# Patient Record
Sex: FEMALE | Race: WHITE | NOT HISPANIC OR LATINO | Employment: UNEMPLOYED | URBAN - METROPOLITAN AREA
[De-identification: names, ages, dates, MRNs, and addresses within clinical notes are randomized per-mention and may not be internally consistent; named-entity substitution may affect disease eponyms.]

---

## 2023-02-28 LAB
ALANINE AMINOTRANSFERASE (SGPT) (U/L) IN SER/PLAS: 21 U/L (ref 7–45)
ALBUMIN (G/DL) IN SER/PLAS: 4.5 G/DL (ref 3.4–5)
ALKALINE PHOSPHATASE (U/L) IN SER/PLAS: 57 U/L (ref 33–136)
ANION GAP IN SER/PLAS: 12 MMOL/L (ref 10–20)
ASPARTATE AMINOTRANSFERASE (SGOT) (U/L) IN SER/PLAS: 22 U/L (ref 9–39)
BILIRUBIN TOTAL (MG/DL) IN SER/PLAS: 0.7 MG/DL (ref 0–1.2)
CALCIUM (MG/DL) IN SER/PLAS: 9.9 MG/DL (ref 8.6–10.6)
CARBON DIOXIDE, TOTAL (MMOL/L) IN SER/PLAS: 31 MMOL/L (ref 21–32)
CHLORIDE (MMOL/L) IN SER/PLAS: 101 MMOL/L (ref 98–107)
CHOLESTEROL (MG/DL) IN SER/PLAS: 199 MG/DL (ref 0–199)
CHOLESTEROL IN HDL (MG/DL) IN SER/PLAS: 77.3 MG/DL
CHOLESTEROL/HDL RATIO: 2.6
CREATININE (MG/DL) IN SER/PLAS: 0.95 MG/DL (ref 0.5–1.05)
ERYTHROCYTE DISTRIBUTION WIDTH (RATIO) BY AUTOMATED COUNT: 12.7 % (ref 11.5–14.5)
ERYTHROCYTE MEAN CORPUSCULAR HEMOGLOBIN CONCENTRATION (G/DL) BY AUTOMATED: 33.2 G/DL (ref 32–36)
ERYTHROCYTE MEAN CORPUSCULAR VOLUME (FL) BY AUTOMATED COUNT: 96 FL (ref 80–100)
ERYTHROCYTES (10*6/UL) IN BLOOD BY AUTOMATED COUNT: 4.39 X10E12/L (ref 4–5.2)
GFR FEMALE: 60 ML/MIN/1.73M2
GLUCOSE (MG/DL) IN SER/PLAS: 92 MG/DL (ref 74–99)
HEMATOCRIT (%) IN BLOOD BY AUTOMATED COUNT: 42.2 % (ref 36–46)
HEMOGLOBIN (G/DL) IN BLOOD: 14 G/DL (ref 12–16)
LDL: 107 MG/DL (ref 0–99)
LEUKOCYTES (10*3/UL) IN BLOOD BY AUTOMATED COUNT: 4 X10E9/L (ref 4.4–11.3)
NRBC (PER 100 WBCS) BY AUTOMATED COUNT: 0 /100 WBC (ref 0–0)
PLATELETS (10*3/UL) IN BLOOD AUTOMATED COUNT: 220 X10E9/L (ref 150–450)
POTASSIUM (MMOL/L) IN SER/PLAS: 4.2 MMOL/L (ref 3.5–5.3)
PROTEIN TOTAL: 6.6 G/DL (ref 6.4–8.2)
SODIUM (MMOL/L) IN SER/PLAS: 140 MMOL/L (ref 136–145)
THYROTROPIN (MIU/L) IN SER/PLAS BY DETECTION LIMIT <= 0.05 MIU/L: 4.37 MIU/L (ref 0.44–3.98)
THYROXINE (T4) FREE (NG/DL) IN SER/PLAS: 1.11 NG/DL (ref 0.78–1.48)
TRIGLYCERIDE (MG/DL) IN SER/PLAS: 76 MG/DL (ref 0–149)
UREA NITROGEN (MG/DL) IN SER/PLAS: 14 MG/DL (ref 6–23)
VLDL: 15 MG/DL (ref 0–40)

## 2023-03-16 DIAGNOSIS — N39.0 URINARY TRACT INFECTION, SITE NOT SPECIFIED: ICD-10-CM

## 2023-03-16 RX ORDER — SULFAMETHOXAZOLE AND TRIMETHOPRIM 800; 160 MG/1; MG/1
TABLET ORAL
COMMUNITY
Start: 2017-11-20 | End: 2023-12-19 | Stop reason: ALTCHOICE

## 2023-03-16 RX ORDER — DENOSUMAB 60 MG/ML
INJECTION SUBCUTANEOUS
COMMUNITY
Start: 2023-01-24

## 2023-03-16 RX ORDER — FAMOTIDINE 40 MG/1
TABLET, FILM COATED ORAL
COMMUNITY
End: 2023-12-19 | Stop reason: ALTCHOICE

## 2023-03-16 RX ORDER — CEFDINIR 300 MG/1
CAPSULE ORAL
COMMUNITY
Start: 2021-01-29 | End: 2023-03-16 | Stop reason: SDUPTHER

## 2023-03-16 RX ORDER — AMLODIPINE BESYLATE 5 MG/1
1 TABLET ORAL DAILY
COMMUNITY
End: 2023-09-07 | Stop reason: ALTCHOICE

## 2023-03-16 RX ORDER — AMOXICILLIN 500 MG
1200 CAPSULE ORAL 2 TIMES DAILY
COMMUNITY

## 2023-03-16 RX ORDER — CEFDINIR 300 MG/1
300 CAPSULE ORAL DAILY
Qty: 90 CAPSULE | Refills: 0 | Status: SHIPPED | OUTPATIENT
Start: 2023-03-16 | End: 2023-03-16 | Stop reason: SDUPTHER

## 2023-03-16 RX ORDER — HYDROCODONE BITARTRATE AND ACETAMINOPHEN 5; 325 MG/1; MG/1
TABLET ORAL
COMMUNITY
Start: 2022-05-13 | End: 2023-12-19 | Stop reason: ALTCHOICE

## 2023-03-16 RX ORDER — VIBEGRON 75 MG/1
1 TABLET, FILM COATED ORAL DAILY
COMMUNITY
Start: 2023-03-06 | End: 2023-12-19 | Stop reason: ALTCHOICE

## 2023-03-16 RX ORDER — NYSTATIN AND TRIAMCINOLONE ACETONIDE 100000; 1 [USP'U]/G; MG/G
OINTMENT TOPICAL
COMMUNITY
Start: 2022-09-08 | End: 2023-09-07 | Stop reason: SDUPTHER

## 2023-03-16 RX ORDER — CLOBETASOL PROPIONATE 0.5 MG/G
OINTMENT TOPICAL
COMMUNITY
Start: 2022-01-13

## 2023-03-16 RX ORDER — CEFDINIR 300 MG/1
300 CAPSULE ORAL DAILY
Qty: 90 CAPSULE | Refills: 0 | Status: SHIPPED | OUTPATIENT
Start: 2023-03-16 | End: 2023-12-19 | Stop reason: SDUPTHER

## 2023-03-16 RX ORDER — ATORVASTATIN CALCIUM 20 MG/1
1 TABLET, FILM COATED ORAL DAILY
COMMUNITY
Start: 2016-11-11 | End: 2023-10-03

## 2023-03-16 RX ORDER — MULTIVITAMIN
1 TABLET ORAL DAILY
COMMUNITY

## 2023-03-16 RX ORDER — CEFDINIR 300 MG/1
CAPSULE ORAL
Qty: 90 CAPSULE | Refills: 3 | Status: SHIPPED | OUTPATIENT
Start: 2023-03-16 | End: 2023-09-07 | Stop reason: SDUPTHER

## 2023-03-16 RX ORDER — TOPIRAMATE SPINKLE 25 MG/1
CAPSULE ORAL
COMMUNITY
End: 2023-12-19 | Stop reason: ALTCHOICE

## 2023-03-16 RX ORDER — ATENOLOL 25 MG/1
TABLET ORAL
COMMUNITY
Start: 2015-05-05 | End: 2023-12-19 | Stop reason: ALTCHOICE

## 2023-03-16 RX ORDER — NAPROXEN SODIUM 220 MG/1
TABLET, FILM COATED ORAL
COMMUNITY
Start: 2020-11-09 | End: 2023-09-07 | Stop reason: SDUPTHER

## 2023-03-16 RX ORDER — CALCIUM CARBONATE/VITAMIN D3 600MG-5MCG
TABLET ORAL
COMMUNITY

## 2023-03-16 RX ORDER — ASCORBIC ACID 125 MG
TABLET,CHEWABLE ORAL 2 TIMES DAILY
COMMUNITY

## 2023-03-17 LAB
CREATININE (MG/DL) IN SER/PLAS: 0.97 MG/DL (ref 0.5–1.05)
GFR FEMALE: 58 ML/MIN/1.73M2
POC CALCIUM IONIZED (MMOL/L) IN BLOOD: 1.31 MMOL/L (ref 1.1–1.33)

## 2023-04-04 LAB
ALBUMIN (G/DL) IN SER/PLAS: 3.9 G/DL (ref 3.4–5)
ANION GAP IN SER/PLAS: 11 MMOL/L (ref 10–20)
CALCIUM (MG/DL) IN SER/PLAS: 9.8 MG/DL (ref 8.6–10.6)
CARBON DIOXIDE, TOTAL (MMOL/L) IN SER/PLAS: 30 MMOL/L (ref 21–32)
CHLORIDE (MMOL/L) IN SER/PLAS: 102 MMOL/L (ref 98–107)
CREATININE (MG/DL) IN SER/PLAS: 0.97 MG/DL (ref 0.5–1.05)
ERYTHROCYTE DISTRIBUTION WIDTH (RATIO) BY AUTOMATED COUNT: 12.8 % (ref 11.5–14.5)
ERYTHROCYTE MEAN CORPUSCULAR HEMOGLOBIN CONCENTRATION (G/DL) BY AUTOMATED: 32.3 G/DL (ref 32–36)
ERYTHROCYTE MEAN CORPUSCULAR VOLUME (FL) BY AUTOMATED COUNT: 98 FL (ref 80–100)
ERYTHROCYTES (10*6/UL) IN BLOOD BY AUTOMATED COUNT: 4.03 X10E12/L (ref 4–5.2)
GFR FEMALE: 58 ML/MIN/1.73M2
GLUCOSE (MG/DL) IN SER/PLAS: 102 MG/DL (ref 74–99)
HEMATOCRIT (%) IN BLOOD BY AUTOMATED COUNT: 39.3 % (ref 36–46)
HEMOGLOBIN (G/DL) IN BLOOD: 12.7 G/DL (ref 12–16)
LEUKOCYTES (10*3/UL) IN BLOOD BY AUTOMATED COUNT: 3.9 X10E9/L (ref 4.4–11.3)
NRBC (PER 100 WBCS) BY AUTOMATED COUNT: 0 /100 WBC (ref 0–0)
PARATHYRIN INTACT (PG/ML) IN SER/PLAS: 38.7 PG/ML (ref 18.5–88)
PHOSPHATE (MG/DL) IN SER/PLAS: 4 MG/DL (ref 2.5–4.9)
PLATELETS (10*3/UL) IN BLOOD AUTOMATED COUNT: 212 X10E9/L (ref 150–450)
POTASSIUM (MMOL/L) IN SER/PLAS: 4.8 MMOL/L (ref 3.5–5.3)
SODIUM (MMOL/L) IN SER/PLAS: 138 MMOL/L (ref 136–145)
UREA NITROGEN (MG/DL) IN SER/PLAS: 17 MG/DL (ref 6–23)

## 2023-05-01 LAB
ALANINE AMINOTRANSFERASE (SGPT) (U/L) IN SER/PLAS: 17 U/L (ref 7–45)
ALBUMIN (G/DL) IN SER/PLAS: 4.3 G/DL (ref 3.4–5)
ALBUMIN (MG/L) IN URINE: 75.2 MG/L
ALBUMIN/CREATININE (UG/MG) IN URINE: 169 UG/MG CRT (ref 0–30)
ALKALINE PHOSPHATASE (U/L) IN SER/PLAS: 58 U/L (ref 33–136)
ANION GAP IN SER/PLAS: 13 MMOL/L (ref 10–20)
APPEARANCE, URINE: ABNORMAL
ASPARTATE AMINOTRANSFERASE (SGOT) (U/L) IN SER/PLAS: 19 U/L (ref 9–39)
BILIRUBIN TOTAL (MG/DL) IN SER/PLAS: 0.4 MG/DL (ref 0–1.2)
BILIRUBIN, URINE: NEGATIVE
BLOOD, URINE: ABNORMAL
CALCIUM (MG/DL) IN SER/PLAS: 9.7 MG/DL (ref 8.6–10.6)
CARBON DIOXIDE, TOTAL (MMOL/L) IN SER/PLAS: 28 MMOL/L (ref 21–32)
CHLORIDE (MMOL/L) IN SER/PLAS: 101 MMOL/L (ref 98–107)
CHOLESTEROL (MG/DL) IN SER/PLAS: 176 MG/DL (ref 0–199)
CHOLESTEROL IN HDL (MG/DL) IN SER/PLAS: 70 MG/DL
CHOLESTEROL/HDL RATIO: 2.5
COLOR, URINE: YELLOW
CREATININE (MG/DL) IN SER/PLAS: 0.92 MG/DL (ref 0.5–1.05)
CREATININE (MG/DL) IN URINE: 44.5 MG/DL (ref 20–320)
ERYTHROCYTE DISTRIBUTION WIDTH (RATIO) BY AUTOMATED COUNT: 12.6 % (ref 11.5–14.5)
ERYTHROCYTE MEAN CORPUSCULAR HEMOGLOBIN CONCENTRATION (G/DL) BY AUTOMATED: 32.4 G/DL (ref 32–36)
ERYTHROCYTE MEAN CORPUSCULAR VOLUME (FL) BY AUTOMATED COUNT: 97 FL (ref 80–100)
ERYTHROCYTES (10*6/UL) IN BLOOD BY AUTOMATED COUNT: 4.32 X10E12/L (ref 4–5.2)
GFR FEMALE: 62 ML/MIN/1.73M2
GLUCOSE (MG/DL) IN SER/PLAS: 94 MG/DL (ref 74–99)
GLUCOSE, URINE: NEGATIVE MG/DL
HEMATOCRIT (%) IN BLOOD BY AUTOMATED COUNT: 41.7 % (ref 36–46)
HEMOGLOBIN (G/DL) IN BLOOD: 13.5 G/DL (ref 12–16)
KETONES, URINE: NEGATIVE MG/DL
LDL: 90 MG/DL (ref 0–99)
LEUKOCYTE ESTERASE, URINE: ABNORMAL
LEUKOCYTES (10*3/UL) IN BLOOD BY AUTOMATED COUNT: 4.4 X10E9/L (ref 4.4–11.3)
MYOGLOBIN, URINE: NEGATIVE
NITRITE, URINE: NEGATIVE
NRBC (PER 100 WBCS) BY AUTOMATED COUNT: 0 /100 WBC (ref 0–0)
PH, URINE: 8 (ref 5–8)
PLATELETS (10*3/UL) IN BLOOD AUTOMATED COUNT: 210 X10E9/L (ref 150–450)
POTASSIUM (MMOL/L) IN SER/PLAS: 4.7 MMOL/L (ref 3.5–5.3)
PROTEIN TOTAL: 6.9 G/DL (ref 6.4–8.2)
PROTEIN, URINE: ABNORMAL MG/DL
RBC, URINE: 69 /HPF (ref 0–5)
SODIUM (MMOL/L) IN SER/PLAS: 137 MMOL/L (ref 136–145)
SPECIFIC GRAVITY, URINE: 1.01 (ref 1–1.03)
SQUAMOUS EPITHELIAL CELLS, URINE: 1 /HPF
THYROTROPIN (MIU/L) IN SER/PLAS BY DETECTION LIMIT <= 0.05 MIU/L: 3.3 MIU/L (ref 0.44–3.98)
TRIGLYCERIDE (MG/DL) IN SER/PLAS: 78 MG/DL (ref 0–149)
UREA NITROGEN (MG/DL) IN SER/PLAS: 16 MG/DL (ref 6–23)
UROBILINOGEN, URINE: <2 MG/DL (ref 0–1.9)
VLDL: 16 MG/DL (ref 0–40)
WBC CLUMPS, URINE: ABNORMAL /HPF
WBC, URINE: 323 /HPF (ref 0–5)

## 2023-05-02 ENCOUNTER — TELEPHONE (OUTPATIENT)
Dept: PRIMARY CARE | Facility: CLINIC | Age: 83
End: 2023-05-02
Payer: MEDICARE

## 2023-05-02 LAB — URINE CULTURE: NORMAL

## 2023-05-02 NOTE — TELEPHONE ENCOUNTER
----- Message from Autumn Weston MD sent at 5/2/2023  1:10 PM EDT -----  Call  results message below.   Then send results  to patient.

## 2023-05-03 ENCOUNTER — TELEPHONE (OUTPATIENT)
Dept: PRIMARY CARE | Facility: CLINIC | Age: 83
End: 2023-05-03
Payer: MEDICARE

## 2023-05-03 NOTE — TELEPHONE ENCOUNTER
----- Message from Autumn Weston MD sent at 5/2/2023  5:53 PM EDT -----  Please call the patient regarding her abnormal result.

## 2023-05-31 LAB
ANION GAP IN SER/PLAS: 11 MMOL/L (ref 10–20)
APPEARANCE, URINE: NORMAL
BILIRUBIN, URINE: NEGATIVE
BLOOD, URINE: NEGATIVE
CALCIUM (MG/DL) IN SER/PLAS: 9.6 MG/DL (ref 8.6–10.6)
CARBON DIOXIDE, TOTAL (MMOL/L) IN SER/PLAS: 30 MMOL/L (ref 21–32)
CHLORIDE (MMOL/L) IN SER/PLAS: 99 MMOL/L (ref 98–107)
COLOR, URINE: YELLOW
CREATININE (MG/DL) IN SER/PLAS: 1.08 MG/DL (ref 0.5–1.05)
GFR FEMALE: 51 ML/MIN/1.73M2
GLUCOSE (MG/DL) IN SER/PLAS: 172 MG/DL (ref 74–99)
GLUCOSE, URINE: NEGATIVE MG/DL
KETONES, URINE: NEGATIVE MG/DL
LEUKOCYTE ESTERASE, URINE: NEGATIVE
NITRITE, URINE: NEGATIVE
PH, URINE: 6 (ref 5–8)
POTASSIUM (MMOL/L) IN SER/PLAS: 4 MMOL/L (ref 3.5–5.3)
PROTEIN, URINE: NEGATIVE MG/DL
SODIUM (MMOL/L) IN SER/PLAS: 136 MMOL/L (ref 136–145)
SPECIFIC GRAVITY, URINE: 1.01 (ref 1–1.03)
UREA NITROGEN (MG/DL) IN SER/PLAS: 19 MG/DL (ref 6–23)
UROBILINOGEN, URINE: <2 MG/DL (ref 0–1.9)

## 2023-06-01 LAB — URINE CULTURE: NO GROWTH

## 2023-06-07 LAB
ANION GAP IN SER/PLAS: 12 MMOL/L (ref 10–20)
APPEARANCE, URINE: NORMAL
BILIRUBIN, URINE: NEGATIVE
BLOOD, URINE: NEGATIVE
CALCIUM (MG/DL) IN SER/PLAS: 9.4 MG/DL (ref 8.6–10.6)
CARBON DIOXIDE, TOTAL (MMOL/L) IN SER/PLAS: 28 MMOL/L (ref 21–32)
CHLORIDE (MMOL/L) IN SER/PLAS: 90 MMOL/L (ref 98–107)
COLOR, URINE: YELLOW
CREATININE (MG/DL) IN SER/PLAS: 0.87 MG/DL (ref 0.5–1.05)
GFR FEMALE: 66 ML/MIN/1.73M2
GLUCOSE (MG/DL) IN SER/PLAS: 101 MG/DL (ref 74–99)
GLUCOSE, URINE: NEGATIVE MG/DL
KETONES, URINE: NEGATIVE MG/DL
LEUKOCYTE ESTERASE, URINE: NEGATIVE
NITRITE, URINE: NEGATIVE
PH, URINE: 7 (ref 5–8)
POTASSIUM (MMOL/L) IN SER/PLAS: 4.8 MMOL/L (ref 3.5–5.3)
PROTEIN, URINE: NEGATIVE MG/DL
SODIUM (MMOL/L) IN SER/PLAS: 125 MMOL/L (ref 136–145)
SPECIFIC GRAVITY, URINE: 1.01 (ref 1–1.03)
UREA NITROGEN (MG/DL) IN SER/PLAS: 17 MG/DL (ref 6–23)
UROBILINOGEN, URINE: <2 MG/DL (ref 0–1.9)

## 2023-06-08 LAB — URINE CULTURE: NO GROWTH

## 2023-06-14 DIAGNOSIS — Z12.31 ENCOUNTER FOR SCREENING MAMMOGRAM FOR MALIGNANT NEOPLASM OF BREAST: ICD-10-CM

## 2023-06-14 LAB
ANION GAP IN SER/PLAS: 10 MMOL/L (ref 10–20)
APPEARANCE, URINE: NORMAL
BILIRUBIN, URINE: NEGATIVE
BLOOD, URINE: NEGATIVE
CALCIUM (MG/DL) IN SER/PLAS: 9.7 MG/DL (ref 8.6–10.6)
CARBON DIOXIDE, TOTAL (MMOL/L) IN SER/PLAS: 33 MMOL/L (ref 21–32)
CHLORIDE (MMOL/L) IN SER/PLAS: 101 MMOL/L (ref 98–107)
COLOR, URINE: YELLOW
CREATININE (MG/DL) IN SER/PLAS: 0.97 MG/DL (ref 0.5–1.05)
GFR FEMALE: 58 ML/MIN/1.73M2
GLUCOSE (MG/DL) IN SER/PLAS: 95 MG/DL (ref 74–99)
GLUCOSE, URINE: NEGATIVE MG/DL
KETONES, URINE: NEGATIVE MG/DL
LEUKOCYTE ESTERASE, URINE: NEGATIVE
NITRITE, URINE: NEGATIVE
PH, URINE: 7 (ref 5–8)
POTASSIUM (MMOL/L) IN SER/PLAS: 3.8 MMOL/L (ref 3.5–5.3)
PROTEIN, URINE: NEGATIVE MG/DL
SODIUM (MMOL/L) IN SER/PLAS: 140 MMOL/L (ref 136–145)
SPECIFIC GRAVITY, URINE: 1.01 (ref 1–1.03)
UREA NITROGEN (MG/DL) IN SER/PLAS: 21 MG/DL (ref 6–23)
UROBILINOGEN, URINE: <2 MG/DL (ref 0–1.9)

## 2023-06-15 LAB — URINE CULTURE: NO GROWTH

## 2023-09-05 ENCOUNTER — TELEPHONE (OUTPATIENT)
Dept: PRIMARY CARE | Facility: CLINIC | Age: 83
End: 2023-09-05
Payer: MEDICARE

## 2023-09-05 DIAGNOSIS — Z00.00 HEALTHCARE MAINTENANCE: Primary | ICD-10-CM

## 2023-09-05 DIAGNOSIS — Z13.6 SCREENING FOR CARDIOVASCULAR CONDITION: ICD-10-CM

## 2023-09-05 DIAGNOSIS — E78.5 HYPERLIPIDEMIA, UNSPECIFIED HYPERLIPIDEMIA TYPE: ICD-10-CM

## 2023-09-05 DIAGNOSIS — Z13.21 ENCOUNTER FOR VITAMIN DEFICIENCY SCREENING: ICD-10-CM

## 2023-09-05 DIAGNOSIS — E03.9 HYPOTHYROIDISM, UNSPECIFIED TYPE: ICD-10-CM

## 2023-09-06 ENCOUNTER — LAB (OUTPATIENT)
Dept: LAB | Facility: LAB | Age: 83
End: 2023-09-06
Payer: MEDICARE

## 2023-09-06 DIAGNOSIS — E78.5 HYPERLIPIDEMIA, UNSPECIFIED HYPERLIPIDEMIA TYPE: ICD-10-CM

## 2023-09-06 DIAGNOSIS — Z13.21 ENCOUNTER FOR VITAMIN DEFICIENCY SCREENING: ICD-10-CM

## 2023-09-06 LAB
CHOLESTEROL (MG/DL) IN SER/PLAS: 194 MG/DL (ref 0–199)
CHOLESTEROL IN HDL (MG/DL) IN SER/PLAS: 77.3 MG/DL
CHOLESTEROL/HDL RATIO: 2.5
LDL: 101 MG/DL (ref 0–99)
THYROXINE (T4) FREE (NG/DL) IN SER/PLAS: 1.06 NG/DL (ref 0.78–1.48)
TRIGLYCERIDE (MG/DL) IN SER/PLAS: 77 MG/DL (ref 0–149)
VLDL: 15 MG/DL (ref 0–40)

## 2023-09-06 PROCEDURE — 80061 LIPID PANEL: CPT

## 2023-09-06 PROCEDURE — 36415 COLL VENOUS BLD VENIPUNCTURE: CPT

## 2023-09-06 PROCEDURE — 82652 VIT D 1 25-DIHYDROXY: CPT

## 2023-09-06 PROCEDURE — 84439 ASSAY OF FREE THYROXINE: CPT

## 2023-09-06 ASSESSMENT — ENCOUNTER SYMPTOMS: ABDOMINAL PAIN: 0

## 2023-09-07 ENCOUNTER — OFFICE VISIT (OUTPATIENT)
Dept: PRIMARY CARE | Facility: CLINIC | Age: 83
End: 2023-09-07
Payer: MEDICARE

## 2023-09-07 VITALS
BODY MASS INDEX: 24.14 KG/M2 | DIASTOLIC BLOOD PRESSURE: 66 MMHG | SYSTOLIC BLOOD PRESSURE: 112 MMHG | HEART RATE: 71 BPM | OXYGEN SATURATION: 94 % | WEIGHT: 141.4 LBS | HEIGHT: 64 IN

## 2023-09-07 DIAGNOSIS — Z13.820 SCREENING FOR OSTEOPOROSIS: ICD-10-CM

## 2023-09-07 DIAGNOSIS — Z13.21 ENCOUNTER FOR VITAMIN DEFICIENCY SCREENING: ICD-10-CM

## 2023-09-07 DIAGNOSIS — E78.5 HYPERLIPIDEMIA, UNSPECIFIED HYPERLIPIDEMIA TYPE: ICD-10-CM

## 2023-09-07 DIAGNOSIS — Z13.6 SCREENING FOR CARDIOVASCULAR CONDITION: ICD-10-CM

## 2023-09-07 DIAGNOSIS — Z00.00 HEALTHCARE MAINTENANCE: ICD-10-CM

## 2023-09-07 DIAGNOSIS — N89.8 VAGINAL IRRITATION: Primary | ICD-10-CM

## 2023-09-07 DIAGNOSIS — N95.9 MENOPAUSAL DISORDER: ICD-10-CM

## 2023-09-07 PROCEDURE — 1160F RVW MEDS BY RX/DR IN RCRD: CPT | Performed by: INTERNAL MEDICINE

## 2023-09-07 PROCEDURE — 99214 OFFICE O/P EST MOD 30 MIN: CPT | Performed by: INTERNAL MEDICINE

## 2023-09-07 PROCEDURE — 1036F TOBACCO NON-USER: CPT | Performed by: INTERNAL MEDICINE

## 2023-09-07 PROCEDURE — 1126F AMNT PAIN NOTED NONE PRSNT: CPT | Performed by: INTERNAL MEDICINE

## 2023-09-07 PROCEDURE — 1159F MED LIST DOCD IN RCRD: CPT | Performed by: INTERNAL MEDICINE

## 2023-09-07 RX ORDER — DESMOPRESSIN ACETATE 0.1 MG/1
TABLET ORAL
COMMUNITY
Start: 2023-05-30 | End: 2023-12-19 | Stop reason: ALTCHOICE

## 2023-09-07 RX ORDER — ESTRADIOL 0.1 MG/G
CREAM VAGINAL
COMMUNITY
Start: 2023-06-20

## 2023-09-07 RX ORDER — FLUTICASONE FUROATE 27.5 UG/1
2 SPRAY, METERED NASAL
COMMUNITY

## 2023-09-07 RX ORDER — ASPIRIN 81 MG/1
1 TABLET ORAL
COMMUNITY

## 2023-09-07 RX ORDER — NAPROXEN SODIUM 220 MG/1
1200 TABLET ORAL 2 TIMES DAILY
COMMUNITY
End: 2023-09-07 | Stop reason: SDUPTHER

## 2023-09-07 RX ORDER — NYSTATIN AND TRIAMCINOLONE ACETONIDE 100000; 1 [USP'U]/G; MG/G
OINTMENT TOPICAL
Qty: 30 G | Refills: 11 | Status: SHIPPED | OUTPATIENT
Start: 2023-09-07

## 2023-09-07 RX ORDER — IMIPRAMINE HYDROCHLORIDE 25 MG/1
TABLET, FILM COATED ORAL
COMMUNITY
Start: 2023-06-30 | End: 2023-12-19 | Stop reason: ALTCHOICE

## 2023-09-07 RX ORDER — IMIPRAMINE HYDROCHLORIDE 50 MG/1
1 TABLET, FILM COATED ORAL NIGHTLY
COMMUNITY
Start: 2023-06-20 | End: 2023-12-19 | Stop reason: ALTCHOICE

## 2023-09-07 ASSESSMENT — PATIENT HEALTH QUESTIONNAIRE - PHQ9
2. FEELING DOWN, DEPRESSED OR HOPELESS: NOT AT ALL
1. LITTLE INTEREST OR PLEASURE IN DOING THINGS: NOT AT ALL
SUM OF ALL RESPONSES TO PHQ9 QUESTIONS 1 AND 2: 0

## 2023-09-07 ASSESSMENT — ENCOUNTER SYMPTOMS
DEPRESSION: 0
OCCASIONAL FEELINGS OF UNSTEADINESS: 0
LOSS OF SENSATION IN FEET: 0

## 2023-09-07 NOTE — PROGRESS NOTES
Subjective   Patient ID: Sherron Correa is a 83 y.o. female who presents for Follow-up (6 mo).  Female  Problem  The patient's primary symptoms include genital lesions and a genital rash. The patient's pertinent negatives include no vaginal discharge. The current episode started in the past 7 days. The problem has been unchanged. The pain is mild. She is not pregnant. No, her partner does not have an STD. She uses tubal ligation for contraception. She is postmenopausal.       6 mo follow up.      Denies   abd pain.  Says   ros is in  error.     Co rash vaginal  she thinks is  years.   No  discharge.   Needs refill nystatin.       Below is the patient's most recent value for Albumin, ALT, AST, BUN, Calcium, Chloride, Cholesterol, CO2, Creatinine, GFR, Glucose, HDL, Hematocrit, Hemoglobin, Hemoglobin A1C, LDL, Magnesium, Phosphorus, Platelets, Potassium, PSA, Sodium, Triglycerides, and WBC.   Lab Results   Component Value Date    ALBUMIN 4.3 05/01/2023    ALT 17 05/01/2023    AST 19 05/01/2023    BUN 21 06/14/2023    CALCIUM 9.7 06/14/2023     06/14/2023    CHOL 194 09/06/2023    CO2 33 (H) 06/14/2023    CREATININE 0.97 06/14/2023    HDL 77.3 09/06/2023    HCT 41.7 05/01/2023    HGB 13.5 05/01/2023    PHOS 4.0 04/04/2023     05/01/2023    K 3.8 06/14/2023     06/14/2023    TRIG 77 09/06/2023    WBC 4.4 05/01/2023     Note: for a comprehensive list of the patient's lab results, access the Results Review activity.  Review of Systems   Genitourinary:  Negative for vaginal discharge and vaginal pain.   All other systems reviewed and are negative.      Objective   BP Readings from Last 3 Encounters:   09/07/23 112/66   05/04/23 149/73   03/06/23 148/76      Wt Readings from Last 3 Encounters:   09/07/23 64.1 kg (141 lb 6.4 oz)   05/19/23 66.2 kg (146 lb)   03/06/23 65.4 kg (144 lb 2 oz)      BMI: Estimated body mass index is 24.65 kg/m² as calculated from the following:    Height as of this  "encounter: 1.613 m (5' 3.5\").    Weight as of this encounter: 64.1 kg (141 lb 6.4 oz).    BSA: Estimated body surface area is 1.69 meters squared as calculated from the following:    Height as of this encounter: 1.613 m (5' 3.5\").    Weight as of this encounter: 64.1 kg (141 lb 6.4 oz).  Physical Exam  Vitals and nursing note reviewed.   Constitutional:       Appearance: Normal appearance.   HENT:      Head: Normocephalic and atraumatic.      Nose: Nose normal.   Eyes:      Conjunctiva/sclera: Conjunctivae normal.   Cardiovascular:      Rate and Rhythm: Normal rate and regular rhythm.   Pulmonary:      Effort: Pulmonary effort is normal.   Skin:     General: Skin is dry.   Neurological:      General: No focal deficit present.      Mental Status: She is alert and oriented to person, place, and time. Mental status is at baseline.   Psychiatric:         Mood and Affect: Mood normal.         Behavior: Behavior normal.         Assessment/Plan   Problem List Items Addressed This Visit    None  Visit Diagnoses       Vaginal irritation    -  Primary    Relevant Medications    nystatin-triamcinolone (Mycolog II) ointment    Screening for cardiovascular condition        Relevant Orders    Lipid Panel    Encounter for vitamin deficiency screening        Relevant Orders    Vitamin D 1,25 Dihydroxy (for eval of hypercalcemia)    Hyperlipidemia, unspecified hyperlipidemia type        Relevant Orders    CBC    Comprehensive Metabolic Panel    Lipid Panel    Thyroxine, Free    Screening for osteoporosis        Relevant Orders    XR DEXA bone density    Menopausal disorder        Relevant Orders    XR DEXA bone density    Healthcare maintenance        Relevant Medications    aspirin 81 mg EC tablet    CALCIUM 26-VIT D3-MAGNESIUM 15 ORAL    multivitamin with iron (pediatric multivitamin-iron) tablet chewable split tablet    imipramine (Tofranil) 25 mg tablet    imipramine (Tofranil) 50 mg tablet    desmopressin (DDAVP) 0.1 mg tablet "    estradiol (Estrace) 0.01 % (0.1 mg/gram) vaginal cream    fluticasone (Flonase Sensimist) 27.5 mcg/actuation nasal spray    nystatin-triamcinolone (Mycolog II) ointment    Other Relevant Orders    CBC    Comprehensive Metabolic Panel    Lipid Panel    Thyroxine, Free    Vitamin D 1,25 Dihydroxy (for eval of hypercalcemia)    XR DEXA bone density          Chronic problems controlled  on current treatment  unless otherwise noted.     CHART  REVIEWED AND  RECONCILED WITH OLD DATA / UPDATED IN NEW SYSTEM:  MEDS,  PROBLEMS,  ALLERGIES, SOC HISTORY.

## 2023-09-09 LAB — VITAMIN D 1,25-DIHYDROXY: 72.6 PG/ML (ref 19.9–79.3)

## 2023-09-21 LAB
CREATININE (MG/DL) IN SER/PLAS: 0.98 MG/DL (ref 0.5–1.05)
GFR FEMALE: 57 ML/MIN/1.73M2
POC CALCIUM IONIZED (MMOL/L) IN BLOOD: 1.26 MMOL/L (ref 1.1–1.33)

## 2023-09-28 ENCOUNTER — LAB (OUTPATIENT)
Dept: LAB | Facility: LAB | Age: 83
End: 2023-09-28
Payer: MEDICARE

## 2023-09-28 DIAGNOSIS — N18.31 CHRONIC KIDNEY DISEASE, STAGE 3A (MULTI): Primary | ICD-10-CM

## 2023-09-28 LAB
ALBUMIN (G/DL) IN SER/PLAS: 4.3 G/DL (ref 3.4–5)
ANION GAP IN SER/PLAS: 12 MMOL/L (ref 10–20)
CALCIUM (MG/DL) IN SER/PLAS: 10.3 MG/DL (ref 8.6–10.6)
CARBON DIOXIDE, TOTAL (MMOL/L) IN SER/PLAS: 31 MMOL/L (ref 21–32)
CHLORIDE (MMOL/L) IN SER/PLAS: 100 MMOL/L (ref 98–107)
CREATININE (MG/DL) IN SER/PLAS: 0.94 MG/DL (ref 0.5–1.05)
GFR FEMALE: 60 ML/MIN/1.73M2
GLUCOSE (MG/DL) IN SER/PLAS: 101 MG/DL (ref 74–99)
PHOSPHATE (MG/DL) IN SER/PLAS: 3.8 MG/DL (ref 2.5–4.9)
POTASSIUM (MMOL/L) IN SER/PLAS: 4.6 MMOL/L (ref 3.5–5.3)
SODIUM (MMOL/L) IN SER/PLAS: 138 MMOL/L (ref 136–145)
UREA NITROGEN (MG/DL) IN SER/PLAS: 18 MG/DL (ref 6–23)

## 2023-09-28 PROCEDURE — 85027 COMPLETE CBC AUTOMATED: CPT

## 2023-09-28 PROCEDURE — 80069 RENAL FUNCTION PANEL: CPT

## 2023-09-28 PROCEDURE — 36415 COLL VENOUS BLD VENIPUNCTURE: CPT

## 2023-09-29 LAB
ERYTHROCYTE DISTRIBUTION WIDTH (RATIO) BY AUTOMATED COUNT: 13.2 % (ref 11.5–14.5)
ERYTHROCYTE MEAN CORPUSCULAR HEMOGLOBIN CONCENTRATION (G/DL) BY AUTOMATED: 31.4 G/DL (ref 32–36)
ERYTHROCYTE MEAN CORPUSCULAR VOLUME (FL) BY AUTOMATED COUNT: 101 FL (ref 80–100)
ERYTHROCYTES (10*6/UL) IN BLOOD BY AUTOMATED COUNT: 4.31 X10E12/L (ref 4–5.2)
HEMATOCRIT (%) IN BLOOD BY AUTOMATED COUNT: 43.7 % (ref 36–46)
HEMOGLOBIN (G/DL) IN BLOOD: 13.7 G/DL (ref 12–16)
LEUKOCYTES (10*3/UL) IN BLOOD BY AUTOMATED COUNT: 4.1 X10E9/L (ref 4.4–11.3)
NRBC (PER 100 WBCS) BY AUTOMATED COUNT: 0 /100 WBC (ref 0–0)
PLATELETS (10*3/UL) IN BLOOD AUTOMATED COUNT: 228 X10E9/L (ref 150–450)

## 2023-10-02 ENCOUNTER — LAB (OUTPATIENT)
Dept: LAB | Facility: LAB | Age: 83
End: 2023-10-02
Payer: MEDICARE

## 2023-10-02 DIAGNOSIS — M81.0 AGE-RELATED OSTEOPOROSIS WITHOUT CURRENT PATHOLOGICAL FRACTURE: Primary | ICD-10-CM

## 2023-10-02 DIAGNOSIS — N18.31 CHRONIC KIDNEY DISEASE, STAGE 3A (MULTI): Primary | ICD-10-CM

## 2023-10-02 LAB
ERYTHROCYTE [DISTWIDTH] IN BLOOD BY AUTOMATED COUNT: 12.7 % (ref 11.5–14.5)
HCT VFR BLD AUTO: 40.6 % (ref 36–46)
HGB BLD-MCNC: 13 G/DL (ref 12–16)
MCH RBC QN AUTO: 32.2 PG (ref 26–34)
MCHC RBC AUTO-ENTMCNC: 32 G/DL (ref 32–36)
MCV RBC AUTO: 101 FL (ref 80–100)
NRBC BLD-RTO: 0 /100 WBCS (ref 0–0)
PLATELET # BLD AUTO: 223 X10*3/UL (ref 150–450)
PMV BLD AUTO: 9.2 FL (ref 7.5–11.5)
RBC # BLD AUTO: 4.04 X10*6/UL (ref 4–5.2)
WBC # BLD AUTO: 4.7 X10*3/UL (ref 4.4–11.3)

## 2023-10-02 PROCEDURE — 36415 COLL VENOUS BLD VENIPUNCTURE: CPT

## 2023-10-02 PROCEDURE — 85027 COMPLETE CBC AUTOMATED: CPT | Performed by: PHYSICIAN ASSISTANT

## 2023-10-02 RX ORDER — DIPHENHYDRAMINE HYDROCHLORIDE 50 MG/ML
50 INJECTION INTRAMUSCULAR; INTRAVENOUS AS NEEDED
Status: CANCELLED | OUTPATIENT
Start: 2024-02-01

## 2023-10-02 RX ORDER — METHYLPREDNISOLONE SODIUM SUCCINATE 40 MG/ML
40 INJECTION INTRAMUSCULAR; INTRAVENOUS AS NEEDED
Status: CANCELLED | OUTPATIENT
Start: 2024-02-01

## 2023-10-02 RX ORDER — ALBUTEROL SULFATE 0.83 MG/ML
3 SOLUTION RESPIRATORY (INHALATION) AS NEEDED
Status: CANCELLED | OUTPATIENT
Start: 2024-02-01

## 2023-10-02 RX ORDER — FAMOTIDINE 10 MG/ML
20 INJECTION INTRAVENOUS ONCE AS NEEDED
Status: CANCELLED | OUTPATIENT
Start: 2024-02-01

## 2023-10-02 RX ORDER — EPINEPHRINE 0.3 MG/.3ML
0.3 INJECTION SUBCUTANEOUS EVERY 5 MIN PRN
Status: CANCELLED | OUTPATIENT
Start: 2024-02-01

## 2023-10-03 DIAGNOSIS — E78.5 HYPERLIPIDEMIA, UNSPECIFIED: ICD-10-CM

## 2023-10-03 RX ORDER — ATORVASTATIN CALCIUM 20 MG/1
20 TABLET, FILM COATED ORAL DAILY
Qty: 90 TABLET | Refills: 1 | Status: SHIPPED | OUTPATIENT
Start: 2023-10-03 | End: 2024-03-30

## 2023-10-16 ENCOUNTER — ANCILLARY PROCEDURE (OUTPATIENT)
Dept: RADIOLOGY | Facility: CLINIC | Age: 83
End: 2023-10-16
Payer: MEDICARE

## 2023-10-16 DIAGNOSIS — N95.8 OTHER SPECIFIED MENOPAUSAL AND PERIMENOPAUSAL DISORDERS: ICD-10-CM

## 2023-10-16 DIAGNOSIS — N95.9 UNSPECIFIED MENOPAUSAL AND PERIMENOPAUSAL DISORDER: ICD-10-CM

## 2023-10-16 DIAGNOSIS — Z13.820 ENCOUNTER FOR SCREENING FOR OSTEOPOROSIS: ICD-10-CM

## 2023-10-16 PROCEDURE — 77080 DXA BONE DENSITY AXIAL: CPT

## 2023-10-16 PROCEDURE — 77080 DXA BONE DENSITY AXIAL: CPT | Performed by: RADIOLOGY

## 2023-12-01 ENCOUNTER — LAB (OUTPATIENT)
Dept: LAB | Facility: LAB | Age: 83
End: 2023-12-01
Payer: MEDICARE

## 2023-12-01 DIAGNOSIS — R35.81 NOCTURNAL POLYURIA: Primary | ICD-10-CM

## 2023-12-01 LAB
APPEARANCE UR: ABNORMAL
BILIRUB UR STRIP.AUTO-MCNC: NEGATIVE MG/DL
COLOR UR: YELLOW
GLUCOSE UR STRIP.AUTO-MCNC: NEGATIVE MG/DL
KETONES UR STRIP.AUTO-MCNC: NEGATIVE MG/DL
LEUKOCYTE ESTERASE UR QL STRIP.AUTO: NEGATIVE
NITRITE UR QL STRIP.AUTO: NEGATIVE
PH UR STRIP.AUTO: 8 [PH]
PROT UR STRIP.AUTO-MCNC: NEGATIVE MG/DL
RBC # UR STRIP.AUTO: NEGATIVE /UL
SP GR UR STRIP.AUTO: 1.01
UROBILINOGEN UR STRIP.AUTO-MCNC: <2 MG/DL

## 2023-12-01 PROCEDURE — 81003 URINALYSIS AUTO W/O SCOPE: CPT

## 2023-12-01 PROCEDURE — 87086 URINE CULTURE/COLONY COUNT: CPT

## 2023-12-02 LAB — BACTERIA UR CULT: NORMAL

## 2023-12-19 ENCOUNTER — OFFICE VISIT (OUTPATIENT)
Dept: PRIMARY CARE | Facility: CLINIC | Age: 83
End: 2023-12-19
Payer: MEDICARE

## 2023-12-19 VITALS
WEIGHT: 144 LBS | HEART RATE: 83 BPM | OXYGEN SATURATION: 98 % | BODY MASS INDEX: 25.52 KG/M2 | HEIGHT: 63 IN | SYSTOLIC BLOOD PRESSURE: 124 MMHG | DIASTOLIC BLOOD PRESSURE: 68 MMHG

## 2023-12-19 DIAGNOSIS — N39.0 RECURRENT UTI: Primary | ICD-10-CM

## 2023-12-19 DIAGNOSIS — N39.0 URINARY TRACT INFECTION, SITE NOT SPECIFIED: ICD-10-CM

## 2023-12-19 PROCEDURE — 1036F TOBACCO NON-USER: CPT

## 2023-12-19 PROCEDURE — 1160F RVW MEDS BY RX/DR IN RCRD: CPT

## 2023-12-19 PROCEDURE — 1126F AMNT PAIN NOTED NONE PRSNT: CPT

## 2023-12-19 PROCEDURE — 99213 OFFICE O/P EST LOW 20 MIN: CPT

## 2023-12-19 PROCEDURE — 1159F MED LIST DOCD IN RCRD: CPT

## 2023-12-19 RX ORDER — CEFDINIR 300 MG/1
300 CAPSULE ORAL DAILY
Qty: 90 CAPSULE | Refills: 1 | Status: SHIPPED | OUTPATIENT
Start: 2023-12-19 | End: 2024-04-02 | Stop reason: SDUPTHER

## 2023-12-19 ASSESSMENT — ENCOUNTER SYMPTOMS
FEVER: 0
FLANK PAIN: 0
NAUSEA: 0
VOMITING: 0
HEMATURIA: 0
ABDOMINAL PAIN: 0
CHILLS: 0
DYSURIA: 0
FREQUENCY: 0
DIARRHEA: 0

## 2023-12-19 ASSESSMENT — PATIENT HEALTH QUESTIONNAIRE - PHQ9
1. LITTLE INTEREST OR PLEASURE IN DOING THINGS: NOT AT ALL
SUM OF ALL RESPONSES TO PHQ9 QUESTIONS 1 AND 2: 0
2. FEELING DOWN, DEPRESSED OR HOPELESS: NOT AT ALL

## 2023-12-19 ASSESSMENT — PAIN SCALES - GENERAL: PAINLEVEL: 0-NO PAIN

## 2023-12-19 NOTE — PROGRESS NOTES
"Subjective   Patient ID: Sherron Correa is a 83 y.o. female who presents for Urinary Problem (Pt requesting refill for cefdinir to prevent UTI /la).    *Dr. Weston's patient    Patient has recurrent UTI. Has been on daily Cefdinir 300 mg to prevent UTI. Has been on 3-4 years. Urine culture on 12/1/2023 was normal. Needs refills.    Denies urinary frequency, urinary urgency, hematuria, dysuria, fever, chills, abdominal pain, nausea, vomiting, diarrhea, flank pain.          Review of Systems   Constitutional:  Negative for chills and fever.   Gastrointestinal:  Negative for abdominal pain, diarrhea, nausea and vomiting.   Genitourinary:  Negative for dysuria, flank pain, frequency, hematuria, pelvic pain and urgency.       Objective   /68   Pulse 83   Ht 1.6 m (5' 3\")   Wt 65.3 kg (144 lb)   SpO2 98%   BMI 25.51 kg/m²     Physical Exam  Constitutional:       Appearance: Normal appearance.   Cardiovascular:      Rate and Rhythm: Normal rate and regular rhythm.      Heart sounds: No murmur heard.  Pulmonary:      Effort: Pulmonary effort is normal.      Breath sounds: Normal breath sounds. No wheezing, rhonchi or rales.   Abdominal:      General: Abdomen is flat. Bowel sounds are normal.      Palpations: Abdomen is soft. There is no hepatomegaly or splenomegaly.      Tenderness: There is no abdominal tenderness. There is no right CVA tenderness or left CVA tenderness.   Skin:     General: Skin is warm and dry.      Findings: No rash.   Neurological:      Mental Status: She is alert.   Psychiatric:         Mood and Affect: Mood and affect normal.         Assessment/Plan   Diagnoses and all orders for this visit:  Recurrent UTI  Urinary tract infection, site not specified  -     cefdinir (Omnicef) 300 mg capsule; Take 1 capsule (300 mg) by mouth once daily.         "

## 2024-02-01 ENCOUNTER — DOCUMENTATION (OUTPATIENT)
Dept: INFUSION THERAPY | Facility: CLINIC | Age: 84
End: 2024-02-01
Payer: MEDICARE

## 2024-02-01 DIAGNOSIS — M81.0 OSTEOPOROSIS, UNSPECIFIED OSTEOPOROSIS TYPE, UNSPECIFIED PATHOLOGICAL FRACTURE PRESENCE: Primary | ICD-10-CM

## 2024-02-01 NOTE — PROGRESS NOTES
Patient to be scheduled for (continuation ) of Prolia injections. - NEEDS LABS DRAWN  For Diagnosis: Osteoporosis  Labs..  CMP / Calcium drawn on: 9/23/2024  Serum Calcium Results: 9.6  Crcl:  46.60 ml/min  Calcium and Vitamin D supplement on medication list? Yes  (if no nurse to encourage discussion of supplementation at visit)  No obvious recent dental work per chart review. Nurse to confirm no dental within past/next 4 weeks at encounter.      Last dose: 09/30/2024                    Next Dose: 03/03/2025    This result meets treatment criteria.

## 2024-03-07 ENCOUNTER — APPOINTMENT (OUTPATIENT)
Dept: PRIMARY CARE | Facility: CLINIC | Age: 84
End: 2024-03-07
Payer: MEDICARE

## 2024-03-22 ENCOUNTER — LAB (OUTPATIENT)
Dept: LAB | Facility: LAB | Age: 84
End: 2024-03-22
Payer: MEDICARE

## 2024-03-22 DIAGNOSIS — M81.0 OSTEOPOROSIS, UNSPECIFIED OSTEOPOROSIS TYPE, UNSPECIFIED PATHOLOGICAL FRACTURE PRESENCE: ICD-10-CM

## 2024-03-22 DIAGNOSIS — Z13.6 SCREENING FOR CARDIOVASCULAR CONDITION: ICD-10-CM

## 2024-03-22 DIAGNOSIS — E78.5 HYPERLIPIDEMIA, UNSPECIFIED HYPERLIPIDEMIA TYPE: ICD-10-CM

## 2024-03-22 DIAGNOSIS — M81.0 AGE-RELATED OSTEOPOROSIS WITHOUT CURRENT PATHOLOGICAL FRACTURE: Primary | ICD-10-CM

## 2024-03-22 DIAGNOSIS — Z00.00 HEALTHCARE MAINTENANCE: ICD-10-CM

## 2024-03-22 DIAGNOSIS — Z13.21 ENCOUNTER FOR VITAMIN DEFICIENCY SCREENING: ICD-10-CM

## 2024-03-22 LAB
ALBUMIN SERPL BCP-MCNC: 4.2 G/DL (ref 3.4–5)
ALP SERPL-CCNC: 65 U/L (ref 33–136)
ALT SERPL W P-5'-P-CCNC: 23 U/L (ref 7–45)
ANION GAP SERPL CALC-SCNC: 12 MMOL/L (ref 10–20)
AST SERPL W P-5'-P-CCNC: 21 U/L (ref 9–39)
BILIRUB SERPL-MCNC: 0.6 MG/DL (ref 0–1.2)
BUN SERPL-MCNC: 16 MG/DL (ref 6–23)
CA-I BLD-SCNC: 1.28 MMOL/L (ref 1.1–1.33)
CALCIUM SERPL-MCNC: 9.7 MG/DL (ref 8.6–10.6)
CHLORIDE SERPL-SCNC: 100 MMOL/L (ref 98–107)
CHOLEST SERPL-MCNC: 181 MG/DL (ref 0–199)
CHOLESTEROL/HDL RATIO: 2.4
CO2 SERPL-SCNC: 30 MMOL/L (ref 21–32)
CREAT SERPL-MCNC: 0.95 MG/DL (ref 0.5–1.05)
EGFRCR SERPLBLD CKD-EPI 2021: 60 ML/MIN/1.73M*2
ERYTHROCYTE [DISTWIDTH] IN BLOOD BY AUTOMATED COUNT: 12.7 % (ref 11.5–14.5)
GLUCOSE SERPL-MCNC: 91 MG/DL (ref 74–99)
HCT VFR BLD AUTO: 40.7 % (ref 36–46)
HDLC SERPL-MCNC: 75.1 MG/DL
HGB BLD-MCNC: 13.6 G/DL (ref 12–16)
LDLC SERPL CALC-MCNC: 92 MG/DL
MCH RBC QN AUTO: 31.5 PG (ref 26–34)
MCHC RBC AUTO-ENTMCNC: 33.4 G/DL (ref 32–36)
MCV RBC AUTO: 94 FL (ref 80–100)
NON HDL CHOLESTEROL: 106 MG/DL (ref 0–149)
NRBC BLD-RTO: 0 /100 WBCS (ref 0–0)
PLATELET # BLD AUTO: 232 X10*3/UL (ref 150–450)
POTASSIUM SERPL-SCNC: 4.1 MMOL/L (ref 3.5–5.3)
PROT SERPL-MCNC: 6.7 G/DL (ref 6.4–8.2)
RBC # BLD AUTO: 4.32 X10*6/UL (ref 4–5.2)
SODIUM SERPL-SCNC: 138 MMOL/L (ref 136–145)
T4 FREE SERPL-MCNC: 1.22 NG/DL (ref 0.78–1.48)
TRIGL SERPL-MCNC: 71 MG/DL (ref 0–149)
VLDL: 14 MG/DL (ref 0–40)
WBC # BLD AUTO: 3.7 X10*3/UL (ref 4.4–11.3)

## 2024-03-22 PROCEDURE — 84439 ASSAY OF FREE THYROXINE: CPT

## 2024-03-22 PROCEDURE — 85027 COMPLETE CBC AUTOMATED: CPT

## 2024-03-22 PROCEDURE — 82652 VIT D 1 25-DIHYDROXY: CPT

## 2024-03-22 PROCEDURE — 80061 LIPID PANEL: CPT

## 2024-03-22 PROCEDURE — 82330 ASSAY OF CALCIUM: CPT

## 2024-03-22 PROCEDURE — 36415 COLL VENOUS BLD VENIPUNCTURE: CPT

## 2024-03-22 PROCEDURE — 80053 COMPREHEN METABOLIC PANEL: CPT

## 2024-03-24 LAB — 1,25(OH)2D3 SERPL-MCNC: 88.4 PG/ML (ref 19.9–79.3)

## 2024-03-28 ENCOUNTER — APPOINTMENT (OUTPATIENT)
Dept: INFUSION THERAPY | Facility: CLINIC | Age: 84
End: 2024-03-28
Payer: MEDICARE

## 2024-03-28 ENCOUNTER — INFUSION (OUTPATIENT)
Dept: INFUSION THERAPY | Facility: CLINIC | Age: 84
End: 2024-03-28
Payer: MEDICARE

## 2024-03-28 VITALS
RESPIRATION RATE: 16 BRPM | HEART RATE: 80 BPM | WEIGHT: 147.3 LBS | TEMPERATURE: 98.3 F | SYSTOLIC BLOOD PRESSURE: 144 MMHG | DIASTOLIC BLOOD PRESSURE: 76 MMHG | OXYGEN SATURATION: 96 % | BODY MASS INDEX: 26.09 KG/M2

## 2024-03-28 DIAGNOSIS — M81.0 AGE-RELATED OSTEOPOROSIS WITHOUT CURRENT PATHOLOGICAL FRACTURE: ICD-10-CM

## 2024-03-28 PROCEDURE — 96372 THER/PROPH/DIAG INJ SC/IM: CPT | Performed by: NURSE PRACTITIONER

## 2024-03-28 RX ORDER — ALBUTEROL SULFATE 0.83 MG/ML
3 SOLUTION RESPIRATORY (INHALATION) AS NEEDED
OUTPATIENT
Start: 2024-09-24

## 2024-03-28 RX ORDER — EPINEPHRINE 0.3 MG/.3ML
0.3 INJECTION SUBCUTANEOUS EVERY 5 MIN PRN
OUTPATIENT
Start: 2024-09-24

## 2024-03-28 RX ORDER — DIPHENHYDRAMINE HYDROCHLORIDE 50 MG/ML
50 INJECTION INTRAMUSCULAR; INTRAVENOUS AS NEEDED
OUTPATIENT
Start: 2024-09-24

## 2024-03-28 RX ORDER — FAMOTIDINE 10 MG/ML
20 INJECTION INTRAVENOUS ONCE AS NEEDED
OUTPATIENT
Start: 2024-09-24

## 2024-03-28 ASSESSMENT — PAIN SCALES - GENERAL: PAINLEVEL: 0-NO PAIN

## 2024-03-28 NOTE — PROGRESS NOTES
[facility]   Infusion Clinic Note   Date: 2024   Name: Sherron Correa  : 1940   MRN: [id number]         Reason for Visit: OP Infusion (Prolia)         Visit Type: INJECTION       Ordered By: Dr. Weston      Accompanied by:Self      Diagnosis: Age-related osteoporosis without current pathological fracture       Allergies:   Allergies as of 2024 - Reviewed 2024   Allergen Reaction Noted    Nitrofurantoin Hives 2023         Current Medications has a current medication list which includes the following prescription(s): aspirin, atorvastatin, calcium 26/vit d3/magnesium 15, calcium carbonate-vitamin d3, cefdinir, cholecalciferol (vitamin d3), clobetasol, cranberry conc-ascorbic acid, prolia, estradiol, fish oil concentrate, flonase sensimist, multivitamin, nystatin-triamcinolone, and omega-3 fatty acids-fish oil.       Vitals:   Vitals:    24 0905   BP: 144/76   Pulse: 80   Resp: 16   Temp: 36.8 °C (98.3 °F)   TempSrc: Temporal   SpO2: 96%   Weight: 66.8 kg (147 lb 4.8 oz)   PainSc: 0-No pain             Infusion Pre-procedure Checklist:   - Allergies reviewed: yes   - Medications reviewed: yes       - Previous reaction to current treatment: no      Assess patient for the concerns below. Document provider notification as appropriate.  - Active or recent infection with/without current antibiotic use: no  - Recent or planned invasive dental work: no  - Recent or planned surgeries: no  - Recently received or plans to receive vaccinations: no  - Has treatment related toxicities: no  - Is pregnant:  no      Pain: 0   - Is the pain different from normal: n/a   - Is the pain tolerable: n/a   - Is your Doctor aware:  n/a      Labs: N/A         Fall Risk Screening: Ramos Fall Risk  History of Falling, Immediate or Within 3 Months: No  Secondary Diagnosis: Yes  Ambulatory Aid: Walks without aid/bedrest/nurse assist  Intravenous Therapy/Heparin Lock:  No  Gait/Transferring: Normal/bedrest/immobile  Mental Status: Oriented to own ability  Ramos Fall Risk Score: 15       Review Of Systems:  Review of Systems - Oncology      Infusion Readiness:   - Assessment Concerns Related to Infusion: No  - Provider notified: n/a      Document Below Only If Indicated:   New Patient Education:    N/A (returning patient for continuation of therapy. Ongoing education provided as needed.)        Treatment Conditions & Drug Specific Questions:    Denosumab  (PROLIA. XGEVA)    (Unless otherwise specified on patient specific therapy plan):     TREATMENT CONDITIONS:  Unless otherwise specified on patient specific therapy plan HOLD and notify provider prior to proceeding with today's injection if patients:  o Calcium is LESS THAN 8.6 mg/dL OR  Ionized Calcium LESS THAN 1.1 mmol/L  o Recent or planned invasive dental procedure (within 4 weeks)    Lab Results   Component Value Date    CALCIUM 9.7 03/22/2024    PHOS 3.8 09/28/2023      Lab Results   Component Value Date    CAION 1.28 03/22/2024       Labs reviewed and patient meets treatment conditions? Yes    DRUG SPECIFIC QUESTIONS:  Is the patient taking calcium and vitamin D? Yes  (Recommended)    Pt Instructed on following risks: (1) hypocalcemia, (2) osteonecrosis of the jaw, (3) atypical femoral fractures, (4) serious infections, and (5) dermatologic reactions?  Yes      REMINDER:  PREGNANCY CATEGORY X DRUG. OBTAIN NEGTATIVE PREGNANCY TEST PRIOR TO FIRST INFUSION FOR WOMEN OF CHILDBEARING ABILITY   REMS DRUG    Recommended Vitals/Observation:  Vitals: Obtain vitals prior to injection.  Observation: Patient may leave immediately following injection.        Weight Based Drug Calculations:    WEIGHT BASED DRUGS: NOT APPLICABLE / FLAT DOSE          Initiated By: Cait Yeung RN   Time: 9:19 AM     We administered denosumab.

## 2024-03-28 NOTE — PATIENT INSTRUCTIONS
Today :We administered denosumab.     For:   1. Age-related osteoporosis without current pathological fracture         Your next appointment is due in:  6 months (after 9/28/2024  )     Please read the  Medication Guide that was given to you and reviewed during todays visit.     (Tell all doctors including dentists that you are taking this medication)     Go to the emergency room or call 911 if:  -You have signs of allergic reaction:   -Rash, hives, itching.   -Swollen, blistered, peeling skin.   -Swelling of face, lips, mouth, tongue or throat.   -Tightness of chest, trouble breathing, swallowing or talking     Call your doctor:  - If IV / injection site gets red, warm, swollen, itchy or leaks fluid or pus.     (Leave dressing on your IV site for at least 2 hours and keep area clean and dry  - If you get sick or have symptoms of infection or are not feeling well for any reason.    (Wash your hands often, stay away from people who are sick)  - If you have side effects from your medication that do not go away or are bothersome.     (Refer to the teaching your nurse gave you for side effects to call your doctor about)    - Common side effects may include:  stuffy nose, headache, feeling tired, muscle aches, upset stomach  - Before receiving any vaccines     - Call the [facility] at   If:  - You get sick, are on antibiotics, have had a recent vaccine, have surgery or dental work and your doctor wants your visit rescheduled.  - You need to cancel and reschedule your visit for any reason. Call at least 2 days before your visit if you need to cancel.   - Your insurance changes before your next visit.    (We will need to get approval from your new insurance. This can take up to two weeks.)     The [facility] is opened Monday thru Friday. We are closed on weekends and holidays.   Voice mail will take your call if the center is closed. If you leave a message please allow 24 hours for a call  back during weekdays. If you leave a message on a weekend/holiday, we will call you back the next business day.

## 2024-03-30 DIAGNOSIS — E78.5 HYPERLIPIDEMIA, UNSPECIFIED: ICD-10-CM

## 2024-03-30 RX ORDER — ATORVASTATIN CALCIUM 20 MG/1
20 TABLET, FILM COATED ORAL DAILY
Qty: 90 TABLET | Refills: 1 | Status: SHIPPED | OUTPATIENT
Start: 2024-03-30

## 2024-04-02 ENCOUNTER — OFFICE VISIT (OUTPATIENT)
Dept: PRIMARY CARE | Facility: CLINIC | Age: 84
End: 2024-04-02
Payer: MEDICARE

## 2024-04-02 VITALS
HEIGHT: 63 IN | BODY MASS INDEX: 26.22 KG/M2 | DIASTOLIC BLOOD PRESSURE: 68 MMHG | WEIGHT: 148 LBS | SYSTOLIC BLOOD PRESSURE: 132 MMHG | OXYGEN SATURATION: 96 % | HEART RATE: 68 BPM

## 2024-04-02 DIAGNOSIS — Z00.00 ROUTINE GENERAL MEDICAL EXAMINATION AT HEALTH CARE FACILITY: Primary | ICD-10-CM

## 2024-04-02 DIAGNOSIS — N81.11 CYSTOCELE, MIDLINE: ICD-10-CM

## 2024-04-02 DIAGNOSIS — E78.5 DYSLIPIDEMIA: ICD-10-CM

## 2024-04-02 DIAGNOSIS — N39.0 URINARY TRACT INFECTION, SITE NOT SPECIFIED: ICD-10-CM

## 2024-04-02 DIAGNOSIS — Z12.31 ENCOUNTER FOR SCREENING MAMMOGRAM FOR BREAST CANCER: ICD-10-CM

## 2024-04-02 DIAGNOSIS — M81.0 AGE-RELATED OSTEOPOROSIS WITHOUT CURRENT PATHOLOGICAL FRACTURE: Chronic | ICD-10-CM

## 2024-04-02 DIAGNOSIS — N18.1 CHRONIC RENAL FAILURE, STAGE 1: ICD-10-CM

## 2024-04-02 DIAGNOSIS — Z13.6 SCREENING FOR CARDIOVASCULAR CONDITION: ICD-10-CM

## 2024-04-02 DIAGNOSIS — N39.0 RECURRENT URINARY TRACT INFECTION: ICD-10-CM

## 2024-04-02 PROBLEM — N18.9 CHRONIC RENAL FAILURE: Status: ACTIVE | Noted: 2017-11-20

## 2024-04-02 PROBLEM — K21.9 GERD (GASTROESOPHAGEAL REFLUX DISEASE): Status: ACTIVE | Noted: 2017-11-20

## 2024-04-02 PROCEDURE — 1159F MED LIST DOCD IN RCRD: CPT | Performed by: INTERNAL MEDICINE

## 2024-04-02 PROCEDURE — 1170F FXNL STATUS ASSESSED: CPT | Performed by: INTERNAL MEDICINE

## 2024-04-02 PROCEDURE — 1126F AMNT PAIN NOTED NONE PRSNT: CPT | Performed by: INTERNAL MEDICINE

## 2024-04-02 PROCEDURE — 93000 ELECTROCARDIOGRAM COMPLETE: CPT | Performed by: INTERNAL MEDICINE

## 2024-04-02 PROCEDURE — G0439 PPPS, SUBSEQ VISIT: HCPCS | Performed by: INTERNAL MEDICINE

## 2024-04-02 PROCEDURE — 99214 OFFICE O/P EST MOD 30 MIN: CPT | Performed by: INTERNAL MEDICINE

## 2024-04-02 RX ORDER — CEFDINIR 300 MG/1
300 CAPSULE ORAL DAILY
Qty: 90 CAPSULE | Refills: 1 | Status: SHIPPED | OUTPATIENT
Start: 2024-04-02

## 2024-04-02 RX ORDER — CEFDINIR 300 MG/1
300 CAPSULE ORAL DAILY
Qty: 90 CAPSULE | Refills: 1 | Status: CANCELLED | OUTPATIENT
Start: 2024-04-02

## 2024-04-02 ASSESSMENT — LIFESTYLE VARIABLES
HOW OFTEN DO YOU HAVE SIX OR MORE DRINKS ON ONE OCCASION: LESS THAN MONTHLY
AUDIT-C TOTAL SCORE: 2
SKIP TO QUESTIONS 9-10: 0
HOW MANY STANDARD DRINKS CONTAINING ALCOHOL DO YOU HAVE ON A TYPICAL DAY: 1 OR 2
HOW OFTEN DO YOU HAVE A DRINK CONTAINING ALCOHOL: MONTHLY OR LESS

## 2024-04-02 ASSESSMENT — COLUMBIA-SUICIDE SEVERITY RATING SCALE - C-SSRS
1. IN THE PAST MONTH, HAVE YOU WISHED YOU WERE DEAD OR WISHED YOU COULD GO TO SLEEP AND NOT WAKE UP?: NO
2. HAVE YOU ACTUALLY HAD ANY THOUGHTS OF KILLING YOURSELF?: NO
6. HAVE YOU EVER DONE ANYTHING, STARTED TO DO ANYTHING, OR PREPARED TO DO ANYTHING TO END YOUR LIFE?: NO

## 2024-04-02 ASSESSMENT — PATIENT HEALTH QUESTIONNAIRE - PHQ9
SUM OF ALL RESPONSES TO PHQ9 QUESTIONS 1 AND 2: 0
1. LITTLE INTEREST OR PLEASURE IN DOING THINGS: NOT AT ALL
1. LITTLE INTEREST OR PLEASURE IN DOING THINGS: NOT AT ALL
2. FEELING DOWN, DEPRESSED OR HOPELESS: NOT AT ALL
SUM OF ALL RESPONSES TO PHQ9 QUESTIONS 1 AND 2: 0
SUM OF ALL RESPONSES TO PHQ9 QUESTIONS 1 AND 2: 0
2. FEELING DOWN, DEPRESSED OR HOPELESS: NOT AT ALL
2. FEELING DOWN, DEPRESSED OR HOPELESS: NOT AT ALL
1. LITTLE INTEREST OR PLEASURE IN DOING THINGS: NOT AT ALL

## 2024-04-02 ASSESSMENT — ACTIVITIES OF DAILY LIVING (ADL)
DOING_HOUSEWORK: INDEPENDENT
GROCERY_SHOPPING: INDEPENDENT
TAKING_MEDICATION: INDEPENDENT
BATHING: INDEPENDENT
DRESSING: INDEPENDENT
MANAGING_FINANCES: INDEPENDENT

## 2024-04-02 ASSESSMENT — PAIN SCALES - GENERAL: PAINLEVEL: 0-NO PAIN

## 2024-04-02 NOTE — PROGRESS NOTES
"Subjective   Patient ID: Sherron Correa is a 84 y.o. female who presents for Medicare Annual Wellness Visit Subsequent.    HPI     Review of Systems    Objective   /68   Pulse 68   Ht 1.6 m (5' 3\")   Wt 67.1 kg (148 lb)   SpO2 96%   BMI 26.22 kg/m²     Physical Exam    Assessment/Plan          "

## 2024-04-07 ASSESSMENT — ENCOUNTER SYMPTOMS
ENDOCRINE NEGATIVE: 1
PSYCHIATRIC NEGATIVE: 1
CONSTITUTIONAL NEGATIVE: 1
EYES NEGATIVE: 1
CARDIOVASCULAR NEGATIVE: 1
MUSCULOSKELETAL NEGATIVE: 1
ALLERGIC/IMMUNOLOGIC NEGATIVE: 1
GASTROINTESTINAL NEGATIVE: 1
NEUROLOGICAL NEGATIVE: 1
RESPIRATORY NEGATIVE: 1
HEMATOLOGIC/LYMPHATIC NEGATIVE: 1

## 2024-06-20 NOTE — PROGRESS NOTES
"Subjective   Patient ID: Sherron Correa is a 84 y.o. female who presents for follow up  HPI  84-year-old with chronic urinary tract infections, vaginal atrophy and lichen sclerosis, pelvic floor weakness and pain, constipation, and asymptomatic mild cystocele with nocturnal polyuria concerns having failed DDAVP therapy due to hyponatremia and having had tachycardia associated with 50 mg imipramine.     The patient  has been noting improvements in her nighttime frequency and urgency  and now notes 0-1 episodes of nocturias. She has not been utilizing any medication. She denies any UTI like symptoms.  She has been UTI free for the last year.  She is continuing her daily cefdinir therapy.    She does note vaginal irritation and \"rawness\" particularly when she wipes.  She notes this for at least the last 3 to 4 weeks.  She states that she has utilized nystatin for \"a few days\" and then clobetasol \"for a few days\" without significant changes to her irritative concerns.  She is utilizing \"baby soap\" vaginally.  She is not utilizing the vaginal estrogen cream. She is not utilizing her clobetasol therapy. She denies any v abnormal bleeding or discharge.     She denies any bowel related complaints, no fecal or flatal incontinence.    She has no other complaints.       From Previous note  This visit was performed through telemedicine  83-year-old with chronic urinary tract infections, vaginal atrophy and lichen sclerosis, pelvic floor weakness and pain, constipation, and asymptomatic mild cystocele with nocturnal polyuria concerns having failed DDAVP therapy     The patient had been utilizing the imipramine with benefits in her nighttime lower urinary tract symptoms, however she started having tachycardia and hence since stopped it. She wishes to try this medication in a lower dose for her night time symptoms. She voids every 2-3 hours during the day with all small volume voids. The possibility of utilizing the Gemtesa " therapy was readdressed. She denies any UTI like symptoms. She is continuing her cefdinir for daily prophylaxis. She will continue her 3 times a week vaginal estrogen therapy.     She denies any vaginal complaints, no abnormal vaginal bleeding or discharge.      She denies any bowel related complaints, no fecal or flatal incontinence.     She has no other complaints.      From previous note  This visit was performed through telemedicine  83-year-old with chronic urinary tract infections, vaginal atrophy and lichen sclerosis, pelvic floor weakness and pain, constipation, and asymptomatic mild cystocele with concerns for UTI and urinary urgency and frequency with nocturia complaints.     The patient appears to be doing well since stopping her DDAVP, she however states her urine is cloudy in the morning but denies any other UTI like symptoms. She notes 3-4 episodes of nocturia but denies any enuresis. She voids every 2-3 hours during the day with all small volume voids. She has not been utilizing the Gemtesa therapy.      She denies any bowel related complaints, no fecal or flatal incontinence.     She denies any vaginal complaints, no abnormal vaginal bleeding or discharge.      She has no other complaints.      From previous note  This visit was performed through telemedicine  83-year-old with chronic urinary tract infections, vaginal atrophy and lichen sclerosis, pelvic floor weakness and pain, constipation, and asymptomatic mild cystocele with concerns for UTI and urinary urgency and frequency with nocturia complaints.     The patient presents to discuss her voiding diary, she does not appear to be consuming fluids before going to sleep, she notes 2-3 episodes of nocturia with all large volume voids. Her daytime frequency is roughly every 3 hours in the 6 to 8 ounce range. However overnight she awakens 2-3 times with voided volumes in the 14 to 16 ounce range. She does however note some lower extremity edema and  "snoring. She denies any UTI like symptoms.      She denies any bowel related complaints, no fecal or flatal incontinence.     She denies any vaginal complaints, no abnormal vaginal bleeding or discharge.      She has no other complaints.      From previous note  83-year-old with chronic urinary tract infections, vaginal atrophy and lichen sclerosis, pelvic floor weakness and pain, constipation, and asymptomatic mild cystocele presenting with concerns for an active UTI.     The patient's urinalysis today appears infected. Of note her urine culture from 5/1/2023 was negative. However the patient has noted persistent urgency and frequency complaints.     She is no longer utilizing her Gemtesa therapy. She does note 2-3 episodes of nocturia with \"large volumes\". She notes daytime frequency and small volume voids.     She has no bowel related complaints. She denies any abnormal vaginal discharge or bleeding.     From previous note  82-year-old with history of chronic urinary tract infections, vaginal atrophy, and lichen sclerosus presenting for follow-up.     She denies any UTI-like symptoms today. Urinalysis appears negative. She is continuing her cefdinir therapy daily. She is also utilizing her vaginal estrogen therapy 2-3 times a week.      Unfortunately over the last 4 weeks the patient has noted persistent and worsening irritative vaginal complaints. She notes some redness to her vaginal tissues and has been utilizing her clobetasol daily because of this. She was instructed last week to stop this medication and she presents today for a pelvic exam.     She has followed up with pelvic floor physical therapy and feels that this is of benefit.     She does note some mild urinary urgency and frequency roughly every 1-2 hours. She notes 2 episodes of nocturia.     She has no other complaints.     From previous note  81-year-old presenting as referral from Dr. Weston with concerns for chronic urinary tract infections.   "   The patient has unfortunately been hospitalized roughly 2 to 3 months ago with a Pseudomonas UTI. She has had recurrent Pseudomonas UTIs and Enterococcus faecalis over the last 2 to 3 months. She is presently on amoxicillin for her most recent UTI diagnosed 1/25/2021. She has noted some improvement in her urinary urgency and nocturia complaints while taking this antibiotic. Sensitivities demonstrate the appropriateness of this antibiotic.     The patient states that prior to her urinary tract infections that she had no significant lower urinary tract complaints. She noted daytime urgency every 3-4 hours and 1-2 episodes of nocturia. However during her infections she notes cloudy urine with worsening urgency and frequency and worsening nocturia complaints. She denies any stress urinary incontinence complaints. She denies a history of nephrolithiasis or any gross hematuria.     She is not utilizing any over-the-counter medications. She recently started vaginal estrogen therapy.     She denies any bowel related complaints. She denies any fecal or flatal incontinence.     She is sexually active but only rarely. She denies any abnormal vaginal bleeding or discharge. She denies any prolapse complaints.     She has no other complaints.     Review of Systems  Constitutional: No fever, No chills and No fatigue.   Eyes: No vision problems and No dryness of the eyes.   ENT: No dry mouth, No hearing loss and No nosebleeds.   Cardiovascular: No chest pain, No palpitations and No orthopnea.   Respiratory: No shortness of breath, No cough and No wheezing.   Gastrointestinal: No abdominal pain, No constipation, No nausea, No diarrhea, No vomiting and No melena.   Genitourinary: As noted in HPI.   Musculoskeletal: No back pain, No myalgias, No muscle weakness, No joint swelling and No leg edema.   Integumentary: No rashes, No skin lesion and No itching.   Neurological: No headache, No numbness and No dizziness.   Psychiatric: No  sleep disturbances, No anxiety and No depression.   Endocrine: No hot flashes, No loss of hair and No hirsutism.   Hematologic/Lymphatic: No swollen glands, No tendency for easy bleeding and No tendency for easy bruising.   All other systems have been reviewed and are negative for complaint.        Objective   Physical Exam  PHYSICAL EXAMINATION:  No LMP recorded.  There is no height or weight on file to calculate BMI.  There were no vitals taken for this visit.  General Appearance: well appearing  Neuro: Alert and oriented   HEENT: mucous membranes moist, neck supple  Resp: No respiratory distress, normal work of breathing  MSK: normal range of motion, gait appropriate    Pelvic:  Genitourinary: There is mild erythema to the external vaginal tissues.  There is loss of architecture of the clitoral payne.  Urethra   urethral caruncle, non-tender, no periurethral mass  Vaginal mucosa  normal  Cervix normal  Uterus normal size, non-tender, mobile  Adnexae  negative nontender, no masses  Atrophy positive    CST negative  Pelvic floor muscle contraction  1/5, no pain throughout exam    POP-Q (in supine position):  Stage 0    Rectal: no hemorrhoids, fissures or masses    Assessment/Plan     84-year-old with chronic urinary tract infections, vaginal atrophy and lichen sclerosis, pelvic floor weakness and pain, constipation, and asymptomatic mild cystocele with history of nocturnal polyuria having failed DDAVP therapy due to hyponatremia and having had tachycardia associated with 50 mg imipramine.     #1 We have previously discussed the patient's chronic urinary tract infections and how this may affect her success with Prolia. We have previously discussed that her present therapy should put her at equal to lower risk of development of a UTI moving forward. She will continue her d-mannose therapy and cranberry extract tablets. She was previously provided a standing order for urinalysis and urine culture moving forward. She  did have a reassuring upper tract ultrasound 11/17/2020. She has previously had mildly elevated postvoid residuals but recent PVRs have been reassuring.  She has completed a year of cefdinir and will stop this now.  We discussed the importance of restarting her vaginal estrogen therapy.     #2 we have previously discussed the patient's weak and mildly painful pelvic floor. She is overall very satisfied with her pelvic floor physical therapy.  She is noted to have no significant pain on exam today 6/21/2024.     #3 The patient never started her Gemtesa therapy as she was concerned this may cause fluid retention due to her previously noted large volume output nocturnal voids. We had previously discussed her voiding diary noting concerns for nocturnal polyuria. We discussed utilizing compression socks for her lower extremity edema and foot elevation. We again discussed the possibility of sleep medicine referral given her concerns for sleep apnea she is not interested in this at this time. DDAVP caused significant hyponatremia and has been stopped. BMP has normalized. 50 mg imipramine caused tachycardia concerns and she has since stopped this. However she did feel that this gave her significant benefits at nighttime.  She has since stopped her imipramine and Gemtesa therapy.  She is noting no significant lower urinary tract complaints at this time.    #4 we again discussed the patient's vaginal irritation complaints.  We discussed stopping her vaginal baby soap now.  We discussed utilizing clobetasol twice daily for the next week.  We discussed restarting her vaginal estrogen therapy daily for the next 2 weeks and then 3 times a week thereafter.  Pending GEN path.  She will also be prescribed fluconazole now.     5. We discussed her constipation complaints. We discussed the benefits of daily fiber therapy and MiraLAX. She'll titrate this to a soft bowel movement every day to 2 days.     6. The patient will contact  clinic should she have no improvements in her vaginal complaints in 1 week.  She will follow-up yearly moving forward.     BRYNN Kelley MD     Scribe Attestation  By signing my name below, I, Rohit Granados attest that this documentation has been prepared under the direction and in the presence of Ld Kellye MD. All medical record entries made by the Scribe were at my direction or personally dictated by me. I have reviewed the chart and agree that the record accurately reflects my personal performance of the history, physical exam, discussion and plan.

## 2024-06-21 ENCOUNTER — OFFICE VISIT (OUTPATIENT)
Dept: UROLOGY | Facility: CLINIC | Age: 84
End: 2024-06-21
Payer: MEDICARE

## 2024-06-21 DIAGNOSIS — R10.2 PELVIC PAIN: ICD-10-CM

## 2024-06-21 DIAGNOSIS — Z00.00 HEALTHCARE MAINTENANCE: ICD-10-CM

## 2024-06-21 DIAGNOSIS — R35.81 NOCTURNAL POLYURIA: ICD-10-CM

## 2024-06-21 DIAGNOSIS — N81.11 CYSTOCELE, MIDLINE: ICD-10-CM

## 2024-06-21 DIAGNOSIS — L90.0 LICHEN SCLEROSUS: Primary | ICD-10-CM

## 2024-06-21 DIAGNOSIS — B37.31 YEAST VAGINITIS: ICD-10-CM

## 2024-06-21 DIAGNOSIS — N95.2 VAGINAL ATROPHY: ICD-10-CM

## 2024-06-21 DIAGNOSIS — N39.0 CHRONIC UTI: ICD-10-CM

## 2024-06-21 DIAGNOSIS — N81.89 PELVIC FLOOR WEAKNESS: ICD-10-CM

## 2024-06-21 LAB
POC APPEARANCE, URINE: CLEAR
POC BILIRUBIN, URINE: NEGATIVE
POC BLOOD, URINE: ABNORMAL
POC COLOR, URINE: YELLOW
POC GLUCOSE, URINE: NEGATIVE MG/DL
POC KETONES, URINE: NEGATIVE MG/DL
POC LEUKOCYTES, URINE: ABNORMAL
POC NITRITE,URINE: NEGATIVE
POC PH, URINE: 6.5 PH
POC PROTEIN, URINE: NEGATIVE MG/DL
POC SPECIFIC GRAVITY, URINE: 1.01
POC UROBILINOGEN, URINE: 0.2 EU/DL

## 2024-06-21 PROCEDURE — 99214 OFFICE O/P EST MOD 30 MIN: CPT | Performed by: OBSTETRICS & GYNECOLOGY

## 2024-06-21 PROCEDURE — 1036F TOBACCO NON-USER: CPT | Performed by: OBSTETRICS & GYNECOLOGY

## 2024-06-21 PROCEDURE — 1160F RVW MEDS BY RX/DR IN RCRD: CPT | Performed by: OBSTETRICS & GYNECOLOGY

## 2024-06-21 PROCEDURE — 81003 URINALYSIS AUTO W/O SCOPE: CPT | Mod: QW | Performed by: OBSTETRICS & GYNECOLOGY

## 2024-06-21 PROCEDURE — 1159F MED LIST DOCD IN RCRD: CPT | Performed by: OBSTETRICS & GYNECOLOGY

## 2024-06-21 RX ORDER — FLUCONAZOLE 150 MG/1
TABLET ORAL
Qty: 2 TABLET | Refills: 0 | Status: SHIPPED | OUTPATIENT
Start: 2024-06-21

## 2024-06-21 RX ORDER — CLOBETASOL PROPIONATE 0.5 MG/G
OINTMENT TOPICAL
Qty: 30 G | Refills: 1 | Status: SHIPPED | OUTPATIENT
Start: 2024-06-21

## 2024-06-21 RX ORDER — ESTRADIOL 0.1 MG/G
CREAM VAGINAL
Qty: 42.5 G | Refills: 2 | Status: SHIPPED | OUTPATIENT
Start: 2024-06-21

## 2024-06-21 NOTE — PATIENT INSTRUCTIONS
Please start your fluconazole therapy now and repeat in 3 days.    Please restart your clobetasol therapy twice daily for the next week.    Please restart your vaginal estrogen there nightly for 2 weeks and then 3 times a week thereafter.    You can stop your cefdinir therapy now.    You have been provided a standing order for urinalysis and urine culture.  Please present to any [facility] lab should you have any UTI-like symptoms.    Please stop all vaginal irritants including soaps for at least the next week.    Please follow-up on an as-needed basis moving forward.    [phone]

## 2024-06-24 ENCOUNTER — HOSPITAL ENCOUNTER (OUTPATIENT)
Dept: RADIOLOGY | Facility: CLINIC | Age: 84
Discharge: HOME | End: 2024-06-24
Payer: MEDICARE

## 2024-06-24 DIAGNOSIS — Z13.6 SCREENING FOR CARDIOVASCULAR CONDITION: ICD-10-CM

## 2024-06-24 PROCEDURE — 75571 CT HRT W/O DYE W/CA TEST: CPT

## 2024-06-29 DIAGNOSIS — B37.31 YEAST VAGINITIS: ICD-10-CM

## 2024-07-01 RX ORDER — FLUCONAZOLE 150 MG/1
TABLET ORAL
Qty: 2 TABLET | Refills: 0 | Status: SHIPPED | OUTPATIENT
Start: 2024-07-01

## 2024-07-03 DIAGNOSIS — B37.31 YEAST VAGINITIS: ICD-10-CM

## 2024-07-03 RX ORDER — FLUCONAZOLE 150 MG/1
TABLET ORAL
Qty: 2 TABLET | Refills: 0 | OUTPATIENT
Start: 2024-07-03

## 2024-07-08 ENCOUNTER — HOSPITAL ENCOUNTER (OUTPATIENT)
Dept: RADIOLOGY | Facility: CLINIC | Age: 84
Discharge: HOME | End: 2024-07-08
Payer: MEDICARE

## 2024-07-08 VITALS — WEIGHT: 145 LBS | BODY MASS INDEX: 25.69 KG/M2 | HEIGHT: 63 IN

## 2024-07-08 DIAGNOSIS — Z12.31 ENCOUNTER FOR SCREENING MAMMOGRAM FOR BREAST CANCER: ICD-10-CM

## 2024-07-08 PROCEDURE — 77067 SCR MAMMO BI INCL CAD: CPT | Performed by: RADIOLOGY

## 2024-07-08 PROCEDURE — 77063 BREAST TOMOSYNTHESIS BI: CPT | Performed by: RADIOLOGY

## 2024-07-08 PROCEDURE — 77067 SCR MAMMO BI INCL CAD: CPT

## 2024-07-30 ENCOUNTER — TELEPHONE (OUTPATIENT)
Dept: UROLOGY | Facility: CLINIC | Age: 84
End: 2024-07-30
Payer: MEDICARE

## 2024-07-30 NOTE — TELEPHONE ENCOUNTER
Patient called and left message. I called patient at [phone]   Patient states she is experiencing a lichen sclerosis flare up. She has utilized her clobetasol cream without relief.  Message has been sent to Dr. Kelley for recommendations. Patient is aware.

## 2024-08-01 ENCOUNTER — TELEPHONE (OUTPATIENT)
Dept: UROLOGY | Facility: HOSPITAL | Age: 84
End: 2024-08-01
Payer: MEDICARE

## 2024-08-01 NOTE — TELEPHONE ENCOUNTER
After spekaing with Dr. Kelley, I called johnson with recommendations for her vaginal irritation.  She should have stopped her vaginal soap use.  If she is using her clobetasol twice a day as well as her vaginal estrogen therapy and continuing to note irritation, I do not have any great magic bullets.  Using a sitz bath or ice vaginally may help.  She may apply any type of topical emollient like Vaseline, Aquaphor, coconut oil, or olive oil to the tissues to help soothe them.  But if the symptoms persist, I would recommend she see a dermatologist.     Patient is aware and will contact clinic in 1-2 weeks with a symptoms update.

## 2024-08-13 ENCOUNTER — LAB (OUTPATIENT)
Dept: LAB | Facility: LAB | Age: 84
End: 2024-08-13
Payer: MEDICARE

## 2024-08-13 DIAGNOSIS — N39.0 CHRONIC UTI: ICD-10-CM

## 2024-08-13 LAB
APPEARANCE UR: ABNORMAL
BACTERIA #/AREA URNS AUTO: ABNORMAL /HPF
BILIRUB UR STRIP.AUTO-MCNC: NEGATIVE MG/DL
CAOX CRY #/AREA UR COMP ASSIST: ABNORMAL /HPF
COLOR UR: YELLOW
GLUCOSE UR STRIP.AUTO-MCNC: NORMAL MG/DL
KETONES UR STRIP.AUTO-MCNC: NEGATIVE MG/DL
LEUKOCYTE ESTERASE UR QL STRIP.AUTO: ABNORMAL
MUCOUS THREADS #/AREA URNS AUTO: ABNORMAL /LPF
NITRITE UR QL STRIP.AUTO: NEGATIVE
PH UR STRIP.AUTO: 5.5 [PH]
PROT UR STRIP.AUTO-MCNC: NEGATIVE MG/DL
RBC # UR STRIP.AUTO: ABNORMAL /UL
RBC #/AREA URNS AUTO: ABNORMAL /HPF
SP GR UR STRIP.AUTO: 1.02
SQUAMOUS #/AREA URNS AUTO: ABNORMAL /HPF
UROBILINOGEN UR STRIP.AUTO-MCNC: NORMAL MG/DL
WBC #/AREA URNS AUTO: >50 /HPF

## 2024-08-13 PROCEDURE — 87086 URINE CULTURE/COLONY COUNT: CPT

## 2024-08-13 PROCEDURE — 81001 URINALYSIS AUTO W/SCOPE: CPT

## 2024-08-14 ENCOUNTER — APPOINTMENT (OUTPATIENT)
Dept: PRIMARY CARE | Facility: CLINIC | Age: 84
End: 2024-08-14
Payer: MEDICARE

## 2024-08-14 LAB
BACTERIA UR CULT: NORMAL
HOLD SPECIMEN: NORMAL

## 2024-08-19 ENCOUNTER — APPOINTMENT (OUTPATIENT)
Dept: UROLOGY | Facility: CLINIC | Age: 84
End: 2024-08-19
Payer: MEDICARE

## 2024-08-19 VITALS — WEIGHT: 145 LBS | TEMPERATURE: 96.5 F | BODY MASS INDEX: 25.69 KG/M2 | HEIGHT: 63 IN

## 2024-08-19 DIAGNOSIS — L90.0 LICHEN SCLEROSUS: ICD-10-CM

## 2024-08-19 DIAGNOSIS — N95.2 VAGINAL ATROPHY: ICD-10-CM

## 2024-08-19 DIAGNOSIS — R31.29 MICROSCOPIC HEMATURIA: ICD-10-CM

## 2024-08-19 DIAGNOSIS — R10.2 PELVIC PAIN: ICD-10-CM

## 2024-08-19 DIAGNOSIS — R35.81 NOCTURNAL POLYURIA: ICD-10-CM

## 2024-08-19 DIAGNOSIS — N18.1 CHRONIC RENAL FAILURE, STAGE 1: ICD-10-CM

## 2024-08-19 DIAGNOSIS — N81.89 PELVIC FLOOR WEAKNESS: ICD-10-CM

## 2024-08-19 DIAGNOSIS — N81.11 CYSTOCELE, MIDLINE: ICD-10-CM

## 2024-08-19 DIAGNOSIS — N39.0 URINARY TRACT INFECTION, SITE NOT SPECIFIED: ICD-10-CM

## 2024-08-19 DIAGNOSIS — N39.0 CHRONIC UTI: Primary | ICD-10-CM

## 2024-08-19 LAB
APPEARANCE UR: ABNORMAL
BILIRUB UR STRIP.AUTO-MCNC: NEGATIVE MG/DL
COLOR UR: YELLOW
GLUCOSE UR STRIP.AUTO-MCNC: NORMAL MG/DL
KETONES UR STRIP.AUTO-MCNC: NEGATIVE MG/DL
LEUKOCYTE ESTERASE UR QL STRIP.AUTO: ABNORMAL
MUCOUS THREADS #/AREA URNS AUTO: ABNORMAL /LPF
NITRITE UR QL STRIP.AUTO: NEGATIVE
PH UR STRIP.AUTO: 6 [PH]
POC APPEARANCE, URINE: CLEAR
POC BILIRUBIN, URINE: NEGATIVE
POC BLOOD, URINE: ABNORMAL
POC COLOR, URINE: YELLOW
POC GLUCOSE, URINE: NEGATIVE MG/DL
POC KETONES, URINE: NEGATIVE MG/DL
POC LEUKOCYTES, URINE: ABNORMAL
POC NITRITE,URINE: NEGATIVE
POC PH, URINE: 6.5 PH
POC PROTEIN, URINE: NEGATIVE MG/DL
POC SPECIFIC GRAVITY, URINE: 1.01
POC UROBILINOGEN, URINE: 0.2 EU/DL
PROT UR STRIP.AUTO-MCNC: NEGATIVE MG/DL
RBC # UR STRIP.AUTO: ABNORMAL /UL
RBC #/AREA URNS AUTO: ABNORMAL /HPF
SP GR UR STRIP.AUTO: 1.02
SQUAMOUS #/AREA URNS AUTO: ABNORMAL /HPF
UROBILINOGEN UR STRIP.AUTO-MCNC: NORMAL MG/DL
WBC #/AREA URNS AUTO: >50 /HPF

## 2024-08-19 PROCEDURE — 1159F MED LIST DOCD IN RCRD: CPT | Performed by: OBSTETRICS & GYNECOLOGY

## 2024-08-19 PROCEDURE — 99214 OFFICE O/P EST MOD 30 MIN: CPT | Performed by: OBSTETRICS & GYNECOLOGY

## 2024-08-19 PROCEDURE — 1160F RVW MEDS BY RX/DR IN RCRD: CPT | Performed by: OBSTETRICS & GYNECOLOGY

## 2024-08-19 PROCEDURE — 81003 URINALYSIS AUTO W/O SCOPE: CPT | Performed by: OBSTETRICS & GYNECOLOGY

## 2024-08-19 PROCEDURE — 87086 URINE CULTURE/COLONY COUNT: CPT

## 2024-08-19 PROCEDURE — 1126F AMNT PAIN NOTED NONE PRSNT: CPT | Performed by: OBSTETRICS & GYNECOLOGY

## 2024-08-19 PROCEDURE — 81001 URINALYSIS AUTO W/SCOPE: CPT

## 2024-08-19 PROCEDURE — G2211 COMPLEX E/M VISIT ADD ON: HCPCS | Performed by: OBSTETRICS & GYNECOLOGY

## 2024-08-19 PROCEDURE — 1036F TOBACCO NON-USER: CPT | Performed by: OBSTETRICS & GYNECOLOGY

## 2024-08-19 ASSESSMENT — PAIN SCALES - GENERAL: PAINLEVEL: 0-NO PAIN

## 2024-08-19 NOTE — PROGRESS NOTES
"Subjective   Patient ID: Sherron Correa is a 84 y.o. female who presents for follow up  HPI  84-year-old with chronic urinary tract infections, vaginal atrophy and lichen sclerosis, pelvic floor weakness and pain, constipation, and asymptomatic mild cystocele with history of nocturnal polyuria having failed DDAVP therapy due to hyponatremia and having had tachycardia associated with 50 mg imipramine.    The patient appears to be noting soreness, heaviness and rash and her vaginal opening. She has been utilizing Vaseline and vaginal estrogen cream. She occasionally utilizes nystatin. She is not utilizing her clobetasol therapy. She denies any v abnormal bleeding or discharge.     She denies any UTI like symptoms. Her UA is negative, her culture forma  week ago was negative as well.    She denies any vaginal complaints, no abnormal bleeding or discharge.     She denies any bowel related complaints, no fecal or flatal incontinence.    She has no other complaints.        From Previous note  84-year-old with chronic urinary tract infections, vaginal atrophy and lichen sclerosis, pelvic floor weakness and pain, constipation, and asymptomatic mild cystocele with nocturnal polyuria concerns having failed DDAVP therapy due to hyponatremia and having had tachycardia associated with 50 mg imipramine.     The patient  has been noting improvements in her nighttime frequency and urgency  and now notes 0-1 episodes of nocturias. She has not been utilizing any medication. She denies any UTI like symptoms.  She has been UTI free for the last year.  She is continuing her daily cefdinir therapy.    She does note vaginal irritation and \"rawness\" particularly when she wipes.  She notes this for at least the last 3 to 4 weeks.  She states that she has utilized nystatin for \"a few days\" and then clobetasol \"for a few days\" without significant changes to her irritative concerns.  She is utilizing \"baby soap\" vaginally.  She is not " utilizing the vaginal estrogen cream. She is not utilizing her clobetasol therapy. She denies any v abnormal bleeding or discharge.     She denies any bowel related complaints, no fecal or flatal incontinence.    She has no other complaints.       From Previous note  This visit was performed through telemedicine  83-year-old with chronic urinary tract infections, vaginal atrophy and lichen sclerosis, pelvic floor weakness and pain, constipation, and asymptomatic mild cystocele with nocturnal polyuria concerns having failed DDAVP therapy     The patient had been utilizing the imipramine with benefits in her nighttime lower urinary tract symptoms, however she started having tachycardia and hence since stopped it. She wishes to try this medication in a lower dose for her night time symptoms. She voids every 2-3 hours during the day with all small volume voids. The possibility of utilizing the Gemtesa therapy was readdressed. She denies any UTI like symptoms. She is continuing her cefdinir for daily prophylaxis. She will continue her 3 times a week vaginal estrogen therapy.     She denies any vaginal complaints, no abnormal vaginal bleeding or discharge.      She denies any bowel related complaints, no fecal or flatal incontinence.     She has no other complaints.      From previous note  This visit was performed through telemedicine  83-year-old with chronic urinary tract infections, vaginal atrophy and lichen sclerosis, pelvic floor weakness and pain, constipation, and asymptomatic mild cystocele with concerns for UTI and urinary urgency and frequency with nocturia complaints.     The patient appears to be doing well since stopping her DDAVP, she however states her urine is cloudy in the morning but denies any other UTI like symptoms. She notes 3-4 episodes of nocturia but denies any enuresis. She voids every 2-3 hours during the day with all small volume voids. She has not been utilizing the Gemtesa therapy.      She  "denies any bowel related complaints, no fecal or flatal incontinence.     She denies any vaginal complaints, no abnormal vaginal bleeding or discharge.      She has no other complaints.      From previous note  This visit was performed through telemedicine  83-year-old with chronic urinary tract infections, vaginal atrophy and lichen sclerosis, pelvic floor weakness and pain, constipation, and asymptomatic mild cystocele with concerns for UTI and urinary urgency and frequency with nocturia complaints.     The patient presents to discuss her voiding diary, she does not appear to be consuming fluids before going to sleep, she notes 2-3 episodes of nocturia with all large volume voids. Her daytime frequency is roughly every 3 hours in the 6 to 8 ounce range. However overnight she awakens 2-3 times with voided volumes in the 14 to 16 ounce range. She does however note some lower extremity edema and snoring. She denies any UTI like symptoms.      She denies any bowel related complaints, no fecal or flatal incontinence.     She denies any vaginal complaints, no abnormal vaginal bleeding or discharge.      She has no other complaints.      From previous note  83-year-old with chronic urinary tract infections, vaginal atrophy and lichen sclerosis, pelvic floor weakness and pain, constipation, and asymptomatic mild cystocele presenting with concerns for an active UTI.     The patient's urinalysis today appears infected. Of note her urine culture from 5/1/2023 was negative. However the patient has noted persistent urgency and frequency complaints.     She is no longer utilizing her Gemtesa therapy. She does note 2-3 episodes of nocturia with \"large volumes\". She notes daytime frequency and small volume voids.     She has no bowel related complaints. She denies any abnormal vaginal discharge or bleeding.     From previous note  82-year-old with history of chronic urinary tract infections, vaginal atrophy, and lichen sclerosus " presenting for follow-up.     She denies any UTI-like symptoms today. Urinalysis appears negative. She is continuing her cefdinir therapy daily. She is also utilizing her vaginal estrogen therapy 2-3 times a week.      Unfortunately over the last 4 weeks the patient has noted persistent and worsening irritative vaginal complaints. She notes some redness to her vaginal tissues and has been utilizing her clobetasol daily because of this. She was instructed last week to stop this medication and she presents today for a pelvic exam.     She has followed up with pelvic floor physical therapy and feels that this is of benefit.     She does note some mild urinary urgency and frequency roughly every 1-2 hours. She notes 2 episodes of nocturia.     She has no other complaints.     From previous note  81-year-old presenting as referral from Dr. Weston with concerns for chronic urinary tract infections.     The patient has unfortunately been hospitalized roughly 2 to 3 months ago with a Pseudomonas UTI. She has had recurrent Pseudomonas UTIs and Enterococcus faecalis over the last 2 to 3 months. She is presently on amoxicillin for her most recent UTI diagnosed 1/25/2021. She has noted some improvement in her urinary urgency and nocturia complaints while taking this antibiotic. Sensitivities demonstrate the appropriateness of this antibiotic.     The patient states that prior to her urinary tract infections that she had no significant lower urinary tract complaints. She noted daytime urgency every 3-4 hours and 1-2 episodes of nocturia. However during her infections she notes cloudy urine with worsening urgency and frequency and worsening nocturia complaints. She denies any stress urinary incontinence complaints. She denies a history of nephrolithiasis or any gross hematuria.     She is not utilizing any over-the-counter medications. She recently started vaginal estrogen therapy.     She denies any bowel related complaints. She  denies any fecal or flatal incontinence.     She is sexually active but only rarely. She denies any abnormal vaginal bleeding or discharge. She denies any prolapse complaints.     She has no other complaints.     Review of Systems  Constitutional: No fever, No chills and No fatigue.   Eyes: No vision problems and No dryness of the eyes.   ENT: No dry mouth, No hearing loss and No nosebleeds.   Cardiovascular: No chest pain, No palpitations and No orthopnea.   Respiratory: No shortness of breath, No cough and No wheezing.   Gastrointestinal: No abdominal pain, No constipation, No nausea, No diarrhea, No vomiting and No melena.   Genitourinary: As noted in HPI.   Musculoskeletal: No back pain, No myalgias, No muscle weakness, No joint swelling and No leg edema.   Integumentary: No rashes, No skin lesion and No itching.   Neurological: No headache, No numbness and No dizziness.   Psychiatric: No sleep disturbances, No anxiety and No depression.   Endocrine: No hot flashes, No loss of hair and No hirsutism.   Hematologic/Lymphatic: No swollen glands, No tendency for easy bleeding and No tendency for easy bruising.   All other systems have been reviewed and are negative for complaint.        Objective   Physical Exam  PHYSICAL EXAMINATION:  No LMP recorded. Patient is postmenopausal.  There is no height or weight on file to calculate BMI.  There were no vitals taken for this visit.  General Appearance: well appearing  Neuro: Alert and oriented   HEENT: mucous membranes moist, neck supple  Resp: No respiratory distress, normal work of breathing  MSK: normal range of motion, gait appropriate    Pelvic:  Genitourinary: There is mild erythema to the external vaginal tissues.  There is loss of architecture of the clitoral payne.  Urethra   urethral caruncle, non-tender, no periurethral mass  Vaginal mucosa  normal  Cervix normal  Uterus normal size, non-tender, mobile  Adnexae  negative nontender, no masses  Atrophy  positive    CST negative  Pelvic floor muscle contraction  1/5, 4 out of 10 pain throughout pelvic floor    POP-Q (in supine position):  Aa-2    Rectal: no hemorrhoids, fissures or masses    Assessment/Plan     84-year-old with chronic urinary tract infections, vaginal atrophy and lichen sclerosis, pelvic floor weakness and pain, constipation, and asymptomatic mild cystocele with history of nocturnal polyuria having failed DDAVP therapy due to hyponatremia and having had tachycardia associated with 50 mg imipramine with worsening pelvic pain and microscopic hematuria.     #1 We have previously discussed the patient's chronic urinary tract infections and how this may affect her success with Prolia. We have previously discussed that her present therapy should put her at equal to lower risk of development of a UTI moving forward. She will continue her d-mannose therapy and cranberry extract tablets. She was previously provided a standing order for urinalysis and urine culture moving forward. She did have a reassuring upper tract ultrasound 11/17/2020. She has previously had mildly elevated postvoid residuals but recent PVRs have been reassuring.  She has completed a year of cefdinir.  She will continue her vaginal estrogen therapy 3 times a week moving forward.    #2 we have previously discussed the patient's weak and painful pelvic floor.  We discussed the importance of following up with repeat pelvic floor physical therapy given her pelvic heaviness and pressure complaints.  She was provided a new referral and list of providers.     #3 The patient never started her Gemtesa therapy as she was concerned this may cause fluid retention due to her previously noted large volume output nocturnal voids. We had previously discussed her voiding diary noting concerns for nocturnal polyuria. We discussed utilizing compression socks for her lower extremity edema and foot elevation. We again discussed the possibility of sleep  "medicine referral given her concerns for sleep apnea she is not interested in this at this time. DDAVP caused significant hyponatremia and has been stopped. BMP has normalized. 50 mg imipramine caused tachycardia concerns and she has since stopped this. However she did feel that this gave her significant benefits at nighttime.  She has since stopped her imipramine and Gemtesa therapy.  She is noting no significant lower urinary tract complaints at this time.    #4 we discussed the patient's recent microscopic hematuria concerns.  She has noted some \"cloudy urine\".  We will proceed with upper tract ultrasound imaging now and office cystoscopy at her earliest convenience.    #5 we again discussed the patient's vaginal irritation complaints.  We discussed stopping her vaginal baby soap now.  We discussed utilizing clobetasol twice daily for the next week.  She will continue her vaginal estrogen therapy  3 times a week.      6. We have previously discussed her constipation complaints. We discussed the benefits of daily fiber therapy and MiraLAX. She'll titrate this to a soft bowel movement every day to 2 days.     7.  The patient will follow-up for cystoscopic evaluation at her earliest convenience.  Will readdress her vaginal complaints at that time.      BRYNN Kelley MD     Scribe Attestation  By signing my name below, I, Rohit Granados attest that this documentation has been prepared under the direction and in the presence of Ld Kelley MD. All medical record entries made by the Scribe were at my direction or personally dictated by me. I have reviewed the chart and agree that the record accurately reflects my personal performance of the history, physical exam, discussion and plan.    "

## 2024-08-19 NOTE — PATIENT INSTRUCTIONS
Please schedule your renal ultrasound by contacting [phone].    Please schedule your office cystoscopy at your earliest convenience.    Please restart your clobetasol therapy twice daily for the next week.    Please follow-up with pelvic floor physical therapy at your earliest convenience. You have been provided a referral as well as a list of providers.     Please use your vaginal estrogen therapy 3 times a week.    You have been provided a standing order for urinalysis and urine culture.  Please present to any [facility] lab should you have any UTI-like symptoms.    Please stop all vaginal irritants including soaps for at least the next week.    Please follow-up on an as-needed basis moving forward.    [phone]

## 2024-08-20 LAB — HOLD SPECIMEN: NORMAL

## 2024-08-20 NOTE — PROGRESS NOTES
Subjective   Patient ID: Sherron Correa is a 84 y.o. female who presents for follow up  HPI  84-year-old with chronic urinary tract infections, vaginal atrophy and lichen sclerosis, pelvic floor weakness and pain, constipation, and asymptomatic mild cystocele with history of nocturnal polyuria having failed DDAVP therapy due to hyponatremia and having had tachycardia associated with 50 mg imipramine with worsening pelvic pain and microscopic hematuria presenting for cystoscopic evaluation today 8/23/2024    Unfortunately the patient's urine is grossly infected.    Patient continues to note episodic urgency and frequency.    From Previous note  84-year-old with chronic urinary tract infections, vaginal atrophy and lichen sclerosis, pelvic floor weakness and pain, constipation, and asymptomatic mild cystocele with history of nocturnal polyuria having failed DDAVP therapy due to hyponatremia and having had tachycardia associated with 50 mg imipramine.    The patient appears to be noting soreness, heaviness and rash and her vaginal opening. She has been utilizing Vaseline and vaginal estrogen cream. She occasionally utilizes nystatin. She is not utilizing her clobetasol therapy. She denies any v abnormal bleeding or discharge.     She denies any UTI like symptoms. Her UA is negative, her culture forma  week ago was negative as well.    She denies any vaginal complaints, no abnormal bleeding or discharge.     She denies any bowel related complaints, no fecal or flatal incontinence.    She has no other complaints.        From Previous note  84-year-old with chronic urinary tract infections, vaginal atrophy and lichen sclerosis, pelvic floor weakness and pain, constipation, and asymptomatic mild cystocele with nocturnal polyuria concerns having failed DDAVP therapy due to hyponatremia and having had tachycardia associated with 50 mg imipramine.     The patient  has been noting improvements in her nighttime frequency and  "urgency  and now notes 0-1 episodes of nocturias. She has not been utilizing any medication. She denies any UTI like symptoms.  She has been UTI free for the last year.  She is continuing her daily cefdinir therapy.    She does note vaginal irritation and \"rawness\" particularly when she wipes.  She notes this for at least the last 3 to 4 weeks.  She states that she has utilized nystatin for \"a few days\" and then clobetasol \"for a few days\" without significant changes to her irritative concerns.  She is utilizing \"baby soap\" vaginally.  She is not utilizing the vaginal estrogen cream. She is not utilizing her clobetasol therapy. She denies any v abnormal bleeding or discharge.     She denies any bowel related complaints, no fecal or flatal incontinence.    She has no other complaints.       From Previous note  This visit was performed through telemedicine  83-year-old with chronic urinary tract infections, vaginal atrophy and lichen sclerosis, pelvic floor weakness and pain, constipation, and asymptomatic mild cystocele with nocturnal polyuria concerns having failed DDAVP therapy     The patient had been utilizing the imipramine with benefits in her nighttime lower urinary tract symptoms, however she started having tachycardia and hence since stopped it. She wishes to try this medication in a lower dose for her night time symptoms. She voids every 2-3 hours during the day with all small volume voids. The possibility of utilizing the Gemtesa therapy was readdressed. She denies any UTI like symptoms. She is continuing her cefdinir for daily prophylaxis. She will continue her 3 times a week vaginal estrogen therapy.     She denies any vaginal complaints, no abnormal vaginal bleeding or discharge.      She denies any bowel related complaints, no fecal or flatal incontinence.     She has no other complaints.      From previous note  This visit was performed through telemedicine  83-year-old with chronic urinary tract " infections, vaginal atrophy and lichen sclerosis, pelvic floor weakness and pain, constipation, and asymptomatic mild cystocele with concerns for UTI and urinary urgency and frequency with nocturia complaints.     The patient appears to be doing well since stopping her DDAVP, she however states her urine is cloudy in the morning but denies any other UTI like symptoms. She notes 3-4 episodes of nocturia but denies any enuresis. She voids every 2-3 hours during the day with all small volume voids. She has not been utilizing the Gemtesa therapy.      She denies any bowel related complaints, no fecal or flatal incontinence.     She denies any vaginal complaints, no abnormal vaginal bleeding or discharge.      She has no other complaints.      From previous note  This visit was performed through telemedicine  83-year-old with chronic urinary tract infections, vaginal atrophy and lichen sclerosis, pelvic floor weakness and pain, constipation, and asymptomatic mild cystocele with concerns for UTI and urinary urgency and frequency with nocturia complaints.     The patient presents to discuss her voiding diary, she does not appear to be consuming fluids before going to sleep, she notes 2-3 episodes of nocturia with all large volume voids. Her daytime frequency is roughly every 3 hours in the 6 to 8 ounce range. However overnight she awakens 2-3 times with voided volumes in the 14 to 16 ounce range. She does however note some lower extremity edema and snoring. She denies any UTI like symptoms.      She denies any bowel related complaints, no fecal or flatal incontinence.     She denies any vaginal complaints, no abnormal vaginal bleeding or discharge.      She has no other complaints.      From previous note  83-year-old with chronic urinary tract infections, vaginal atrophy and lichen sclerosis, pelvic floor weakness and pain, constipation, and asymptomatic mild cystocele presenting with concerns for an active UTI.     The  "patient's urinalysis today appears infected. Of note her urine culture from 5/1/2023 was negative. However the patient has noted persistent urgency and frequency complaints.     She is no longer utilizing her Gemtesa therapy. She does note 2-3 episodes of nocturia with \"large volumes\". She notes daytime frequency and small volume voids.     She has no bowel related complaints. She denies any abnormal vaginal discharge or bleeding.     From previous note  82-year-old with history of chronic urinary tract infections, vaginal atrophy, and lichen sclerosus presenting for follow-up.     She denies any UTI-like symptoms today. Urinalysis appears negative. She is continuing her cefdinir therapy daily. She is also utilizing her vaginal estrogen therapy 2-3 times a week.      Unfortunately over the last 4 weeks the patient has noted persistent and worsening irritative vaginal complaints. She notes some redness to her vaginal tissues and has been utilizing her clobetasol daily because of this. She was instructed last week to stop this medication and she presents today for a pelvic exam.     She has followed up with pelvic floor physical therapy and feels that this is of benefit.     She does note some mild urinary urgency and frequency roughly every 1-2 hours. She notes 2 episodes of nocturia.     She has no other complaints.     From previous note  81-year-old presenting as referral from Dr. Weston with concerns for chronic urinary tract infections.     The patient has unfortunately been hospitalized roughly 2 to 3 months ago with a Pseudomonas UTI. She has had recurrent Pseudomonas UTIs and Enterococcus faecalis over the last 2 to 3 months. She is presently on amoxicillin for her most recent UTI diagnosed 1/25/2021. She has noted some improvement in her urinary urgency and nocturia complaints while taking this antibiotic. Sensitivities demonstrate the appropriateness of this antibiotic.     The patient states that prior to " her urinary tract infections that she had no significant lower urinary tract complaints. She noted daytime urgency every 3-4 hours and 1-2 episodes of nocturia. However during her infections she notes cloudy urine with worsening urgency and frequency and worsening nocturia complaints. She denies any stress urinary incontinence complaints. She denies a history of nephrolithiasis or any gross hematuria.     She is not utilizing any over-the-counter medications. She recently started vaginal estrogen therapy.     She denies any bowel related complaints. She denies any fecal or flatal incontinence.     She is sexually active but only rarely. She denies any abnormal vaginal bleeding or discharge. She denies any prolapse complaints.     She has no other complaints.     Review of Systems  Constitutional: No fever, No chills and No fatigue.   Eyes: No vision problems and No dryness of the eyes.   ENT: No dry mouth, No hearing loss and No nosebleeds.   Cardiovascular: No chest pain, No palpitations and No orthopnea.   Respiratory: No shortness of breath, No cough and No wheezing.   Gastrointestinal: No abdominal pain, No constipation, No nausea, No diarrhea, No vomiting and No melena.   Genitourinary: As noted in HPI.   Musculoskeletal: No back pain, No myalgias, No muscle weakness, No joint swelling and No leg edema.   Integumentary: No rashes, No skin lesion and No itching.   Neurological: No headache, No numbness and No dizziness.   Psychiatric: No sleep disturbances, No anxiety and No depression.   Endocrine: No hot flashes, No loss of hair and No hirsutism.   Hematologic/Lymphatic: No swollen glands, No tendency for easy bleeding and No tendency for easy bruising.   All other systems have been reviewed and are negative for complaint.        Objective   Physical Exam  PHYSICAL EXAMINATION:  No LMP recorded. Patient is postmenopausal.  There is no height or weight on file to calculate BMI.  There were no vitals taken for  this visit.  General Appearance: well appearing  Neuro: Alert and oriented   HEENT: mucous membranes moist, neck supple  Resp: No respiratory distress, normal work of breathing  MSK: normal range of motion, gait appropriate    Assessment/Plan     84-year-old with chronic urinary tract infections, vaginal atrophy and lichen sclerosis, pelvic floor weakness and pain, constipation, and asymptomatic mild cystocele with history of nocturnal polyuria having failed DDAVP therapy due to hyponatremia and having had tachycardia associated with 50 mg imipramine with worsening pelvic pain and microscopic hematuria presenting with acute urinary tract infection    #1 unfortunate appears that the patient has an acute urinary tract infection today with small blood, leukocytes, and positive nitrites.  Pending urine culture.  The patient will be empirically started on ciprofloxacin now.  We have previously discussed the patient's chronic urinary tract infections and how this may affect her success with Prolia. We have previously discussed that her present therapy should put her at equal to lower risk of development of a UTI moving forward. She will continue her d-mannose therapy and cranberry extract tablets. She was previously provided a standing order for urinalysis and urine culture moving forward. She did have a reassuring upper tract ultrasound 11/17/2020 and most recently 8/21/2024.  She has previously had mildly elevated postvoid residuals but recent PVRs have been reassuring.  She has completed a year of cefdinir.  She will continue her vaginal estrogen therapy 3 times a week moving forward.    #2 we have previously discussed the patient's weak and painful pelvic floor.  We discussed the importance of following up with repeat pelvic floor physical therapy given her pelvic heaviness and pressure complaints.  She was provided a new referral and list of providers.  She has not had the opportunity to follow-up.     #3 The patient  "never started her Gemtesa therapy as she was concerned this may cause fluid retention due to her previously noted large volume output nocturnal voids. We had previously discussed her voiding diary noting concerns for nocturnal polyuria. We discussed utilizing compression socks for her lower extremity edema and foot elevation. We again discussed the possibility of sleep medicine referral given her concerns for sleep apnea she is not interested in this at this time. DDAVP caused significant hyponatremia and has been stopped. BMP has normalized. 50 mg imipramine caused tachycardia concerns and she has since stopped this. However she did feel that this gave her significant benefits at nighttime.  She has since stopped her imipramine and Gemtesa therapy.  We will readdress her lower urinary tract symptoms outside of a UTI.    #4 we discussed the patient's recent microscopic hematuria concerns.  She has noted some \"cloudy urine\".  As noted above, 8/21/2024 kidney ultrasound was reassuring and negative.  We will reschedule her cystoscopic evaluation until next week.    #5 we again discussed the patient's vaginal irritation complaints.  We discussed stopping her vaginal baby soap now.  We discussed utilizing clobetasol twice daily for the next week.  She will continue her vaginal estrogen therapy  3 times a week.      6. We have previously discussed her constipation complaints. We discussed the benefits of daily fiber therapy and MiraLAX. She'll titrate this to a soft bowel movement every day to 2 days.     7.  The patient will follow-up for cystoscopic evaluation in 1 week.  She will be contacted with the results of her urine culture should she require alternate therapy.  Will readdress the need for prophylactic antibiotics at her follow-up appointment.  We will proceed with urinalysis and PVR at her follow-up appointment as well.     BRYNN Kelley MD     Scribe Attestation  By signing my name below, I, Michelle " Rohit Jarrett attest that this documentation has been prepared under the direction and in the presence of Ld Kelley MD. All medical record entries made by the Scribe were at my direction or personally dictated by me. I have reviewed the chart and agree that the record accurately reflects my personal performance of the history, physical exam, discussion and plan.

## 2024-08-21 ENCOUNTER — HOSPITAL ENCOUNTER (OUTPATIENT)
Dept: RADIOLOGY | Facility: CLINIC | Age: 84
Discharge: HOME | End: 2024-08-21
Payer: MEDICARE

## 2024-08-21 ENCOUNTER — APPOINTMENT (OUTPATIENT)
Dept: RADIOLOGY | Facility: CLINIC | Age: 84
End: 2024-08-21
Payer: MEDICARE

## 2024-08-21 DIAGNOSIS — R31.29 MICROSCOPIC HEMATURIA: ICD-10-CM

## 2024-08-21 LAB — BACTERIA UR CULT: ABNORMAL

## 2024-08-21 PROCEDURE — 76770 US EXAM ABDO BACK WALL COMP: CPT

## 2024-08-21 PROCEDURE — 76770 US EXAM ABDO BACK WALL COMP: CPT | Performed by: RADIOLOGY

## 2024-08-23 ENCOUNTER — PROCEDURE VISIT (OUTPATIENT)
Dept: UROLOGY | Facility: CLINIC | Age: 84
End: 2024-08-23
Payer: MEDICARE

## 2024-08-23 VITALS — WEIGHT: 144.9 LBS | BODY MASS INDEX: 25.67 KG/M2

## 2024-08-23 DIAGNOSIS — L90.0 LICHEN SCLEROSUS: ICD-10-CM

## 2024-08-23 DIAGNOSIS — N39.0 ACUTE UTI: Primary | ICD-10-CM

## 2024-08-23 DIAGNOSIS — N39.0 CHRONIC UTI: ICD-10-CM

## 2024-08-23 DIAGNOSIS — N81.89 PELVIC FLOOR WEAKNESS: ICD-10-CM

## 2024-08-23 DIAGNOSIS — N18.1 CHRONIC RENAL FAILURE, STAGE 1: ICD-10-CM

## 2024-08-23 DIAGNOSIS — R10.2 PELVIC PAIN: ICD-10-CM

## 2024-08-23 DIAGNOSIS — R31.29 MICROSCOPIC HEMATURIA: ICD-10-CM

## 2024-08-23 DIAGNOSIS — N81.11 CYSTOCELE, MIDLINE: ICD-10-CM

## 2024-08-23 DIAGNOSIS — N95.2 VAGINAL ATROPHY: ICD-10-CM

## 2024-08-23 DIAGNOSIS — R35.81 NOCTURNAL POLYURIA: ICD-10-CM

## 2024-08-23 LAB
POC APPEARANCE, URINE: ABNORMAL
POC BILIRUBIN, URINE: NEGATIVE
POC BLOOD, URINE: ABNORMAL
POC COLOR, URINE: YELLOW
POC GLUCOSE, URINE: NEGATIVE MG/DL
POC KETONES, URINE: NEGATIVE MG/DL
POC LEUKOCYTES, URINE: ABNORMAL
POC NITRITE,URINE: POSITIVE
POC PH, URINE: 7 PH
POC PROTEIN, URINE: NEGATIVE MG/DL
POC SPECIFIC GRAVITY, URINE: 1.02
POC UROBILINOGEN, URINE: 0.2 EU/DL

## 2024-08-23 PROCEDURE — G2211 COMPLEX E/M VISIT ADD ON: HCPCS | Performed by: OBSTETRICS & GYNECOLOGY

## 2024-08-23 PROCEDURE — 99213 OFFICE O/P EST LOW 20 MIN: CPT | Performed by: OBSTETRICS & GYNECOLOGY

## 2024-08-23 PROCEDURE — 81003 URINALYSIS AUTO W/O SCOPE: CPT | Performed by: OBSTETRICS & GYNECOLOGY

## 2024-08-23 PROCEDURE — 52000 CYSTOURETHROSCOPY: CPT | Performed by: OBSTETRICS & GYNECOLOGY

## 2024-08-23 PROCEDURE — 87086 URINE CULTURE/COLONY COUNT: CPT | Performed by: OBSTETRICS & GYNECOLOGY

## 2024-08-23 RX ORDER — CIPROFLOXACIN 500 MG/1
500 TABLET ORAL ONCE
Status: DISCONTINUED | OUTPATIENT
Start: 2024-08-23 | End: 2024-08-23

## 2024-08-23 RX ORDER — CIPROFLOXACIN 250 MG/1
250 TABLET, FILM COATED ORAL 2 TIMES DAILY
Qty: 14 TABLET | Refills: 0 | Status: SHIPPED | OUTPATIENT
Start: 2024-08-23 | End: 2024-08-30

## 2024-08-23 NOTE — PATIENT INSTRUCTIONS
Please follow-up in 1 week for cystoscopy.    Please start your ciprofloxacin twice daily for the next 7 days.  You will be contacted with the results of your urine culture should you require alternate therapy.    Please continue your vaginal estrogen therapy and clobetasol therapy.    Please continue your fiber and MiraLAX for your bowel movement complaints.    Please contact the clinic with any questions or concerns.    [phone]

## 2024-08-25 ENCOUNTER — APPOINTMENT (OUTPATIENT)
Dept: RADIOLOGY | Facility: HOSPITAL | Age: 84
End: 2024-08-25
Payer: MEDICARE

## 2024-08-25 ENCOUNTER — HOSPITAL ENCOUNTER (EMERGENCY)
Facility: HOSPITAL | Age: 84
Discharge: HOME | End: 2024-08-25
Attending: EMERGENCY MEDICINE
Payer: MEDICARE

## 2024-08-25 ENCOUNTER — HOSPITAL ENCOUNTER (EMERGENCY)
Facility: HOSPITAL | Age: 84
Discharge: OTHER INSTITUTION | End: 2024-08-25
Attending: STUDENT IN AN ORGANIZED HEALTH CARE EDUCATION/TRAINING PROGRAM
Payer: MEDICARE

## 2024-08-25 VITALS
RESPIRATION RATE: 18 BRPM | SYSTOLIC BLOOD PRESSURE: 130 MMHG | HEIGHT: 63 IN | WEIGHT: 143.3 LBS | OXYGEN SATURATION: 96 % | BODY MASS INDEX: 25.39 KG/M2 | DIASTOLIC BLOOD PRESSURE: 75 MMHG | TEMPERATURE: 97.9 F | HEART RATE: 75 BPM

## 2024-08-25 VITALS
WEIGHT: 143 LBS | OXYGEN SATURATION: 96 % | HEART RATE: 77 BPM | RESPIRATION RATE: 16 BRPM | TEMPERATURE: 98 F | HEIGHT: 63 IN | BODY MASS INDEX: 25.34 KG/M2 | SYSTOLIC BLOOD PRESSURE: 145 MMHG | DIASTOLIC BLOOD PRESSURE: 83 MMHG

## 2024-08-25 DIAGNOSIS — H53.121 TRANSIENT VISUAL LOSS OF RIGHT EYE: Primary | ICD-10-CM

## 2024-08-25 DIAGNOSIS — G45.3 AMAUROSIS FUGAX OF RIGHT EYE: Primary | ICD-10-CM

## 2024-08-25 DIAGNOSIS — Z00.00 HEALTHCARE MAINTENANCE: ICD-10-CM

## 2024-08-25 DIAGNOSIS — E78.5 HYPERLIPIDEMIA, UNSPECIFIED: ICD-10-CM

## 2024-08-25 LAB
ALBUMIN SERPL-MCNC: 4.1 G/DL (ref 3.5–5)
ALP BLD-CCNC: 78 U/L (ref 35–125)
ALT SERPL-CCNC: 22 U/L (ref 5–40)
ANION GAP SERPL CALC-SCNC: 13 MMOL/L
AST SERPL-CCNC: 25 U/L (ref 5–40)
BACTERIA UR CULT: NORMAL
BASOPHILS # BLD AUTO: 0.03 X10*3/UL (ref 0–0.1)
BASOPHILS NFR BLD AUTO: 0.5 %
BILIRUB SERPL-MCNC: 0.3 MG/DL (ref 0.1–1.2)
BUN SERPL-MCNC: 16 MG/DL (ref 8–25)
CALCIUM SERPL-MCNC: 9.7 MG/DL (ref 8.5–10.4)
CHLORIDE SERPL-SCNC: 102 MMOL/L (ref 97–107)
CHOLEST SERPL-MCNC: 191 MG/DL (ref 133–200)
CHOLEST/HDLC SERPL: 2.4 {RATIO}
CO2 SERPL-SCNC: 22 MMOL/L (ref 24–31)
CREAT SERPL-MCNC: 0.8 MG/DL (ref 0.4–1.6)
EGFRCR SERPLBLD CKD-EPI 2021: 73 ML/MIN/1.73M*2
EOSINOPHIL # BLD AUTO: 0.06 X10*3/UL (ref 0–0.4)
EOSINOPHIL NFR BLD AUTO: 1 %
ERYTHROCYTE [DISTWIDTH] IN BLOOD BY AUTOMATED COUNT: 12.8 % (ref 11.5–14.5)
EST. AVERAGE GLUCOSE BLD GHB EST-MCNC: 123 MG/DL
GLUCOSE SERPL-MCNC: 73 MG/DL (ref 65–99)
HBA1C MFR BLD: 5.9 %
HCT VFR BLD AUTO: 44.7 % (ref 36–46)
HDLC SERPL-MCNC: 80 MG/DL
HGB BLD-MCNC: 14.7 G/DL (ref 12–16)
IMM GRANULOCYTES # BLD AUTO: 0.01 X10*3/UL (ref 0–0.5)
IMM GRANULOCYTES NFR BLD AUTO: 0.2 % (ref 0–0.9)
LDLC SERPL CALC-MCNC: 97 MG/DL (ref 65–130)
LYMPHOCYTES # BLD AUTO: 1.73 X10*3/UL (ref 0.8–3)
LYMPHOCYTES NFR BLD AUTO: 28.3 %
MCH RBC QN AUTO: 31.1 PG (ref 26–34)
MCHC RBC AUTO-ENTMCNC: 32.9 G/DL (ref 32–36)
MCV RBC AUTO: 95 FL (ref 80–100)
MONOCYTES # BLD AUTO: 0.56 X10*3/UL (ref 0.05–0.8)
MONOCYTES NFR BLD AUTO: 9.2 %
NEUTROPHILS # BLD AUTO: 3.73 X10*3/UL (ref 1.6–5.5)
NEUTROPHILS NFR BLD AUTO: 60.8 %
NRBC BLD-RTO: 0 /100 WBCS (ref 0–0)
PLATELET # BLD AUTO: 204 X10*3/UL (ref 150–450)
POTASSIUM SERPL-SCNC: 4.5 MMOL/L (ref 3.4–5.1)
PROT SERPL-MCNC: 7.2 G/DL (ref 5.9–7.9)
RBC # BLD AUTO: 4.72 X10*6/UL (ref 4–5.2)
SODIUM SERPL-SCNC: 137 MMOL/L (ref 133–145)
TRIGL SERPL-MCNC: 69 MG/DL (ref 40–150)
WBC # BLD AUTO: 6.1 X10*3/UL (ref 4.4–11.3)

## 2024-08-25 PROCEDURE — 70496 CT ANGIOGRAPHY HEAD: CPT | Performed by: RADIOLOGY

## 2024-08-25 PROCEDURE — 83036 HEMOGLOBIN GLYCOSYLATED A1C: CPT

## 2024-08-25 PROCEDURE — 70450 CT HEAD/BRAIN W/O DYE: CPT

## 2024-08-25 PROCEDURE — 85025 COMPLETE CBC W/AUTO DIFF WBC: CPT | Performed by: STUDENT IN AN ORGANIZED HEALTH CARE EDUCATION/TRAINING PROGRAM

## 2024-08-25 PROCEDURE — 80053 COMPREHEN METABOLIC PANEL: CPT | Performed by: STUDENT IN AN ORGANIZED HEALTH CARE EDUCATION/TRAINING PROGRAM

## 2024-08-25 PROCEDURE — 99285 EMERGENCY DEPT VISIT HI MDM: CPT | Mod: 25

## 2024-08-25 PROCEDURE — 36415 COLL VENOUS BLD VENIPUNCTURE: CPT | Performed by: STUDENT IN AN ORGANIZED HEALTH CARE EDUCATION/TRAINING PROGRAM

## 2024-08-25 PROCEDURE — 80061 LIPID PANEL: CPT

## 2024-08-25 PROCEDURE — 70498 CT ANGIOGRAPHY NECK: CPT | Performed by: RADIOLOGY

## 2024-08-25 PROCEDURE — 2500000001 HC RX 250 WO HCPCS SELF ADMINISTERED DRUGS (ALT 637 FOR MEDICARE OP): Performed by: EMERGENCY MEDICINE

## 2024-08-25 PROCEDURE — 99284 EMERGENCY DEPT VISIT MOD MDM: CPT | Mod: 25

## 2024-08-25 PROCEDURE — 70498 CT ANGIOGRAPHY NECK: CPT

## 2024-08-25 PROCEDURE — 2550000001 HC RX 255 CONTRASTS: Performed by: EMERGENCY MEDICINE

## 2024-08-25 RX ORDER — ATORVASTATIN CALCIUM 20 MG/1
40 TABLET, FILM COATED ORAL ONCE
Status: COMPLETED | OUTPATIENT
Start: 2024-08-25 | End: 2024-08-25

## 2024-08-25 RX ORDER — NAPROXEN SODIUM 220 MG/1
81 TABLET, FILM COATED ORAL ONCE
Status: COMPLETED | OUTPATIENT
Start: 2024-08-25 | End: 2024-08-25

## 2024-08-25 RX ORDER — ATORVASTATIN CALCIUM 20 MG/1
40 TABLET, FILM COATED ORAL DAILY
Qty: 90 TABLET | Refills: 0 | Status: SHIPPED | OUTPATIENT
Start: 2024-08-25 | End: 2024-09-24

## 2024-08-25 RX ORDER — ASPIRIN 81 MG/1
81 TABLET ORAL
Qty: 21 TABLET | Refills: 0 | Status: SHIPPED | OUTPATIENT
Start: 2024-08-25

## 2024-08-25 ASSESSMENT — COLUMBIA-SUICIDE SEVERITY RATING SCALE - C-SSRS

## 2024-08-25 ASSESSMENT — PAIN SCALES - GENERAL
PAINLEVEL_OUTOF10: 0 - NO PAIN

## 2024-08-25 ASSESSMENT — TONOMETRY
OS_IOP_MMHG: 12
OD_IOP_MMHG: 13

## 2024-08-25 ASSESSMENT — PAIN - FUNCTIONAL ASSESSMENT
PAIN_FUNCTIONAL_ASSESSMENT: 0-10
PAIN_FUNCTIONAL_ASSESSMENT: 0-10

## 2024-08-25 ASSESSMENT — VISUAL ACUITY
OU: 1
OD: 20/20
OU: 20/15
OS: 20/25

## 2024-08-25 ASSESSMENT — LIFESTYLE VARIABLES
HAVE YOU EVER FELT YOU SHOULD CUT DOWN ON YOUR DRINKING: NO
EVER HAD A DRINK FIRST THING IN THE MORNING TO STEADY YOUR NERVES TO GET RID OF A HANGOVER: NO
TOTAL SCORE: 0
EVER FELT BAD OR GUILTY ABOUT YOUR DRINKING: NO
HAVE PEOPLE ANNOYED YOU BY CRITICIZING YOUR DRINKING: NO

## 2024-08-25 ASSESSMENT — PAIN DESCRIPTION - ONSET: ONSET: AWAKENED FROM SLEEP

## 2024-08-25 ASSESSMENT — PAIN DESCRIPTION - FREQUENCY: FREQUENCY: CONSTANT/CONTINUOUS

## 2024-08-25 ASSESSMENT — PAIN DESCRIPTION - PROGRESSION: CLINICAL_PROGRESSION: NOT CHANGED

## 2024-08-25 NOTE — ASSESSMENT & PLAN NOTE
84-year-old female with history of hyperlipidemia presenting with painless vision loss in her right eye lasting approximately 10 minutes.  CTH negative for acute stroke or hemorrhage. Given short lasting symptoms and description of painless loss of vision, this episode likely represents amaurosis fugax. Given low risk patient, ABCD2 score of 1, will follow algorithm for workup. CTH and CTA H/N with outpatient follow up. Will start ASA and increase statin.

## 2024-08-25 NOTE — PROGRESS NOTES
Emergency Department Transition of Care Note       Signout   I received Sherron Correa in signout from Dr. Martinez.  Please see the ED Provider Note for all HPI, PE and MDM up to the time of signout at 1900.  This is in addition to the primary record.    In brief Sherron Correa is an 84 y.o. female presenting for amaurosis fugax, evaluated by neurology and ophthalmology with recommendations to start aspirin and increase her dose of atorvastatin to 40 mg daily. Neurology spoke with their stroke attending who did not feel that the patient needed to be admitted to the hospital. Neurology recommended obtaining CT angio of the head and neck and then discharging the patient on daily baby aspirin and 40 mg oral atorvastatin.     At the time of signout we were awaiting:  CT angio head neck    ED Course & Medical Decision Making   Medical Decision Making:  Under my care, CT angio head and neck scans were completed and showed no acute processes.  I discussed results with the patient and her family at bedside.  Patient remained stable. Patient and her family felt comfortable going home. Prescriptions for increased dose of statin along with aspirin were sent to her pharmacy and I discussed that neurology referral had been placed. Patient was instructed to follow-up closely with stroke neurology. All questions answered at bedside, discharged in stable condition. The patient was instructed of supportive measures and to also follow-up with a primary care physician and ophthalmology as needed. Return precautions were provided, for which the patient expressed understanding. The patient was discharged home in stable condition. They should feel free to return to the Emergency Department at any time should their condition worsen or should they have any questions or concerns.     ED Course:  ED Course as of 08/25/24 2127   Sun Aug 25, 2024   1626 Previous laboratory and imaging workup at outside hospital negative. CT head shows no  evidence of bleeding or stroke. Laboratory workup without severe electrolyte derangements, anemia or leukocytosis. [JELENA]   2030 CT angio head and neck w and wo IV contrast  No acute intracranial abnormality. Orbits are unremarkable.      No evidence for significant stenosis of the cervical vessels.      No evidence for significant stenosis or large branch vessel cutoffs of the intracranial vessels.   [AW]      ED Course User Index  [AW] Daniella Amaya DO  [JELENA] Ruperto Martinez MD         Diagnoses as of 08/25/24 2127   Amaurosis fugax of right eye       Disposition   As a result of the work-up, the patient was discharged home.  she was informed of her diagnosis and instructed to come back with any concerns or worsening of condition.  she and was agreeable to the plan as discussed above.  she was given the opportunity to ask questions.  All of the patient's questions were answered.    Procedures   Procedures    Patient seen and discussed with ED attending physician.    Daniella Amaya DO  Emergency Medicine

## 2024-08-25 NOTE — ED TRIAGE NOTES
Pt to ed as a transfer from [facility] with complaints of R eye vision loss. Pt states she has a hx of ocular migraines but said these s/s are nothing like they've been before. Neuro exam neg. No hx of cva in past. Pt denies pain and states s/s have since resolved. Vitals stable.

## 2024-08-25 NOTE — ED PROVIDER NOTES
HPI   Chief Complaint   Patient presents with    Loss of Vision     Loss of vision this am in right eye lasted about 5 min but is back now, pt states she's on antibiotic for a uti       Patient is an 84-year-old female who presents emergency room for evaluation of painless loss of vision in her right eye.  Patient states that she woke up this morning and had complete loss of vision in her right eye.  She states it lasted for roughly 5 minutes before it started seeming very patchy and slowly came back to normal.  Patient was concerned because this is never happened before.  She denies any pain in the eye, eye trauma, headache, lightheadedness, dizziness, nausea or vomiting.  Patient denies any prior history of stroke.  She is not on any blood thinners.      History provided by:  Patient          Patient History   Past Medical History:   Diagnosis Date    Altered mental status, unspecified 03/17/2022    Mental status alteration    Body mass index (BMI) 23.0-23.9, adult 11/19/2021    BMI 23.0-23.9, adult    Body mass index (BMI) 24.0-24.9, adult 06/18/2021    BMI 24.0-24.9, adult    Body mass index (BMI) 24.0-24.9, adult 02/22/2022    BMI 24.0-24.9, adult    Chronic kidney disease, stage 1 04/09/2018    Stage 1 chronic kidney disease    Chronic rhinitis 02/09/2015    Rhinitis    Encounter for follow-up examination after completed treatment for conditions other than malignant neoplasm 03/17/2022    Hospital discharge follow-up    Hypo-osmolality and hyponatremia 03/18/2022    Hyponatremia    Other conditions influencing health status     No significant past medical history    Other conditions influencing health status 04/09/2018    History of cough    Other decreased white blood cell count 04/16/2019    Other decreased white blood cell (WBC) count    Other diseases of vocal cords 03/07/2015    Vocal cord dysfunction    Other female genital prolapse 01/13/2022    Pelvic floor weakness    Other malaise 08/14/2019     Malaise and fatigue    Other specified symptoms and signs involving the circulatory and respiratory systems 06/29/2015    Chronic throat clearing    Other voice and resonance disorders 04/09/2018    Other voice disturbance    Pelvic and perineal pain 11/15/2021    Pelvic pressure in female    Pelvic and perineal pain 06/23/2022    Female pelvic pain    Personal history of other diseases of the female genital tract 01/13/2022    History of vaginitis    Personal history of other diseases of the nervous system and sense organs 08/02/2019    History of blepharitis    Personal history of other diseases of the nervous system and sense organs 08/30/2022    History of benign essential tremor    Personal history of other diseases of the respiratory system 02/09/2015    History of sinusitis    Personal history of other infectious and parasitic diseases 01/01/2021    History of gram negative infection    Personal history of other specified conditions 10/28/2021    History of urinary frequency    Personal history of other specified conditions 03/17/2022    History of bradycardia    Personal history of other specified conditions 03/17/2022    History of palpitations    Personal history of other specified conditions 04/09/2018    History of voice disturbance    Personal history of urinary (tract) infections 04/09/2018    History of recurrent urinary tract infection    Shortness of breath 10/26/2021    Short of breath on exertion    Unspecified subjective visual disturbances 08/02/2019    Disturbance, visual, subjective    Unspecified symptoms and signs involving the genitourinary system 10/23/2020    Urinary symptom or sign    Unspecified symptoms and signs involving the genitourinary system 04/30/2021    Urinary symptom or sign    Urinary tract infection, site not specified 09/16/2021    Recurrent UTI    Urinary tract infection, site not specified 03/03/2022    Acute UTI     Past Surgical History:   Procedure Laterality Date     MR HEAD ANGIO WO IV CONTRAST  10/13/2020    MR HEAD ANGIO WO IV CONTRAST [facility]    OTHER SURGICAL HISTORY  03/02/2015    History Of Prior Surgery     Family History   Problem Relation Name Age of Onset    Breast cancer Mother  70     Social History     Tobacco Use    Smoking status: Never    Smokeless tobacco: Never   Substance Use Topics    Alcohol use: Not Currently    Drug use: Never       Physical Exam   ED Triage Vitals [08/25/24 0808]   Temperature Heart Rate Respirations BP   36.6 °C (97.9 °F) 84 16 137/78      Pulse Ox Temp Source Heart Rate Source Patient Position   97 % Oral Monitor Sitting      BP Location FiO2 (%)     Right arm --       Physical Exam  Vitals and nursing note reviewed.   Constitutional:       General: She is not in acute distress.     Appearance: Normal appearance. She is not ill-appearing.   HENT:      Head: Normocephalic and atraumatic.      Mouth/Throat:      Mouth: Mucous membranes are moist.   Eyes:      General: Lids are normal. Vision grossly intact. No scleral icterus.        Right eye: No discharge.         Left eye: No discharge.      Intraocular pressure: Right eye pressure is 13 mmHg. Left eye pressure is 12 mmHg.      Extraocular Movements: Extraocular movements intact.      Right eye: Normal extraocular motion and no nystagmus.      Left eye: Normal extraocular motion and no nystagmus.      Conjunctiva/sclera:      Right eye: Right conjunctiva is not injected. No chemosis, exudate or hemorrhage.     Left eye: Left conjunctiva is not injected. No chemosis, exudate or hemorrhage.     Pupils: Pupils are equal, round, and reactive to light.   Abdominal:      General: Abdomen is flat.   Neurological:      Mental Status: She is alert.       Recent Results (from the past 24 hour(s))   CBC and Auto Differential    Collection Time: 08/25/24 11:45 AM   Result Value Ref Range    WBC 6.1 4.4 - 11.3 x10*3/uL    nRBC 0.0 0.0 - 0.0 /100 WBCs    RBC 4.72 4.00 - 5.20 x10*6/uL     Hemoglobin 14.7 12.0 - 16.0 g/dL    Hematocrit 44.7 36.0 - 46.0 %    MCV 95 80 - 100 fL    MCH 31.1 26.0 - 34.0 pg    MCHC 32.9 32.0 - 36.0 g/dL    RDW 12.8 11.5 - 14.5 %    Platelets 204 150 - 450 x10*3/uL    Neutrophils % 60.8 40.0 - 80.0 %    Immature Granulocytes %, Automated 0.2 0.0 - 0.9 %    Lymphocytes % 28.3 13.0 - 44.0 %    Monocytes % 9.2 2.0 - 10.0 %    Eosinophils % 1.0 0.0 - 6.0 %    Basophils % 0.5 0.0 - 2.0 %    Neutrophils Absolute 3.73 1.60 - 5.50 x10*3/uL    Immature Granulocytes Absolute, Automated 0.01 0.00 - 0.50 x10*3/uL    Lymphocytes Absolute 1.73 0.80 - 3.00 x10*3/uL    Monocytes Absolute 0.56 0.05 - 0.80 x10*3/uL    Eosinophils Absolute 0.06 0.00 - 0.40 x10*3/uL    Basophils Absolute 0.03 0.00 - 0.10 x10*3/uL   Comprehensive metabolic panel    Collection Time: 08/25/24 11:45 AM   Result Value Ref Range    Glucose 74 65 - 99 mg/dL    Sodium 135 133 - 145 mmol/L    Potassium 4.4 3.4 - 5.1 mmol/L    Chloride 102 97 - 107 mmol/L    Bicarbonate 25 24 - 31 mmol/L    Urea Nitrogen      Creatinine 0.80 0.40 - 1.60 mg/dL    eGFR 73 >60 mL/min/1.73m*2    Calcium 9.5 8.5 - 10.4 mg/dL    Albumin 4.3 3.5 - 5.0 g/dL    Alkaline Phosphatase 76 35 - 125 U/L    Total Protein 7.4 5.9 - 7.9 g/dL    AST 21 5 - 40 U/L    Bilirubin, Total 0.4 0.1 - 1.2 mg/dL    ALT 22 5 - 40 U/L    Anion Gap 8 <=19 mmol/L         ED Course & MDM   Diagnoses as of 08/25/24 1210   Transient visual loss of right eye                 No data recorded     Pilar Coma Scale Score: 15 (08/25/24 0809 : Chely Paz, EMT)                           Medical Decision Making  Patient is an 84-year-old female who presents emergency department for evaluation of painless vision loss in the right eye.  Patient resting comfortably in no obvious distress on exam.  He is awake, alert and oriented.  She has normal neurologic exam at this time and vision is completely back to baseline.  Extraocular eye movements are intact.  Patient has an NIH  stroke score of 0 and I have low suspicion for stroke at this time however I am concerned for possible TIA versus possible amaurosis fugax.  CT scan of the brain shows no evidence of intracranial hemorrhage.  Blood work unremarkable include normal white count, normal electrolytes and normal kidney function.  CT scan of the brain shows no evidence of intracranial hemorrhage or mass.  She does have normal pressure in the eyes bilaterally and has remained asymptomatic throughout stay in the emergency department.  I did discuss case with ophthalmology and they are also concerned patient may require further stroke workup but however do recommend transfer ED to ED so they can be evaluated by ophthalmology for further detailed funduscopic exam.  I did discuss this with patient and she is agreeable for transfer at this time.  Case was also discussed with emergency department physician at [facility] and they excepted patient for transfer.        Procedure  Procedures     Al Carrera, DO  08/25/24 1210

## 2024-08-25 NOTE — ED PROVIDER NOTES
TRANSFER ACCEPTANCE NOTE  Right eye - full vision loss, Transient monocular vision loss  Normal neuro exam  No CN deficits        Wai Ortiz MD MPH  08/25/24 7816

## 2024-08-25 NOTE — CONSULTS
Reason For Consult  Transient painless vision loss OD    History Of Present Illness  Sherron Correa is a 84 y.o. 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 Upon awakening this morning, patient noted that she had complete black out of right eye's vision. No pain, discomfort, inciting flashes/floaters. Vision came back in patched after about 5 minutes to full and baseline vision. Since then, no issues with vision. Nothing like this has happened before.    Denies recent headache, jaw claudication, proximal muscle weakness, diplopia, pulsatile tinnitus, extremity numbness, weakness.     Past Medical History  She has a past medical history of Altered mental status, unspecified (03/17/2022), Body mass index (BMI) 23.0-23.9, adult (11/19/2021), Body mass index (BMI) 24.0-24.9, adult (06/18/2021), Body mass index (BMI) 24.0-24.9, adult (02/22/2022), Chronic kidney disease, stage 1 (04/09/2018), Chronic rhinitis (02/09/2015), Encounter for follow-up examination after completed treatment for conditions other than malignant neoplasm (03/17/2022), Hypo-osmolality and hyponatremia (03/18/2022), Other conditions influencing health status, Other conditions influencing health status (04/09/2018), Other decreased white blood cell count (04/16/2019), Other diseases of vocal cords (03/07/2015), Other female genital prolapse (01/13/2022), Other malaise (08/14/2019), Other specified symptoms and signs involving the circulatory and respiratory systems (06/29/2015), Other voice and resonance disorders (04/09/2018), Pelvic and perineal pain (11/15/2021), Pelvic and perineal pain (06/23/2022), Personal history of other diseases of the female genital tract (01/13/2022), Personal history of other diseases of the nervous system and sense organs (08/02/2019), Personal history of other diseases of the nervous system and sense organs (08/30/2022), Personal history of  other diseases of the respiratory system (02/09/2015), Personal history of other infectious and parasitic diseases (01/01/2021), Personal history of other specified conditions (10/28/2021), Personal history of other specified conditions (03/17/2022), Personal history of other specified conditions (03/17/2022), Personal history of other specified conditions (04/09/2018), Personal history of urinary (tract) infections (04/09/2018), Shortness of breath (10/26/2021), Unspecified subjective visual disturbances (08/02/2019), Unspecified symptoms and signs involving the genitourinary system (10/23/2020), Unspecified symptoms and signs involving the genitourinary system (04/30/2021), Urinary tract infection, site not specified (09/16/2021), and Urinary tract infection, site not specified (03/03/2022).    Surgical History  She has a past surgical history that includes Other surgical history (03/02/2015) and MR angio head wo IV contrast (10/13/2020).    Social History  She reports that she has never smoked. She has never used smokeless tobacco. She reports that she does not currently use alcohol. She reports that she does not use drugs.    Family History  Family History   Problem Relation Name Age of Onset    Breast cancer Mother  70        Allergies  Nitrofurantoin     Physical Exam  Base Eye Exam       Visual Acuity (Snellen - Linear)         Right Left    Near cc 20/20 20/20-1              Tonometry (Tonopen, 3:05 PM)         Right Left    Pressure 14 15              Pupils         Dark Light Shape React APD    Right 4 2 Round Brisk None    Left 4 2 Round Brisk None              Visual Fields (Counting fingers)         Left Right     Full Full              Extraocular Movement         Right Left     Full, Ortho Full, Ortho              Neuro/Psych       Oriented x3: Yes                  Additional Tests       Color         Right Left    Ishihara 11/11 11/11                  Slit Lamp and Fundus Exam       External Exam   "       Right Left    External Normal Normal              Slit Lamp Exam         Right Left    Lids/Lashes Dermatochalasis Dermatochalasis    Conjunctiva/Sclera White and quiet White and quiet    Cornea Clear Clear    Anterior Chamber Deep and quiet Deep and quiet    Iris Round and reactive, no NVI Round and reactive, no NVI    Lens PCIOL s/p YAG PCIOL s/p YAG    Anterior Vitreous Normal Normal              Fundus Exam         Right Left    Disc Normal Normal    C/D Ratio 0.20 0.20    Macula Normal Normal    Vessels Normal, no Hollenhorst plaques Normal, no Hollenhorst plaques    Periphery Normal Normal                     Last Recorded Vitals  Blood pressure 167/76, pulse 86, temperature 36.8 °C (98.2 °F), temperature source Oral, resp. rate 16, height 1.6 m (5' 3\"), weight 64.9 kg (143 lb), SpO2 97%.    Relevant Results  CT head without contrast - no acute infarct/hemorrhage       Assessment/Plan   This 84F history pseudophakia, HLD presents with transient painless vision loss OD. Entrance testing and exam at this time without pathology, however given age and story, would treat this episode as an episode of TMVL. Would pursue full TIA workup including neurologic evaluation as well as further neuroimaging and neurology consultation.     #Transient monocular vision loss, right eye  - Given age, although GCA lower suspicion, recommend ESR/CRP  - Recommend Duplex doppler US of bilateral carotids, echocardiogram  - Recommend neuroimaging per neurology/stroke team  - Recommend neuro/stroke evaluation      Mart Dick MD   Ophthalmology PGY3    Ophthalmology Adult Pager - [phone]  Ophthalmology Pediatrics Pager - [phone]     For adult follow-up appointments, call: [phone]  For pediatric follow-up appointments, call: [phone]          "

## 2024-08-25 NOTE — DISCHARGE INSTRUCTIONS
Please schedule an appointment and follow-up with neurology. A referral has been sent.     If this happens again, we would recommend you present to [facility] for further evaluation.

## 2024-08-25 NOTE — ED PROVIDER NOTES
History of Present Illness   Information Gathering: History collected from patient and chart review    HPI:  Sherron Correa is a 84 y.o. female with PMH significant for HLD presenting to the emergency department for mono ocular vision loss of the right eye. Patient initially presented to [facility] for her presenting complaint. Patient states that she woke up that morning with complete vision loss of the right eye. When she would cover her right eye, she could see normally out of the left but when she covered her left, there is complete visual loss of the right eye. Vision loss was painless. After approximately 10 to 15 minutes, vision returned but this frightened patient and she presented to [facility]. At [facility], previous labs and imaging workup negative including CT head. Patient was then transferred to [facility] for ophthalmology and neurology consult.    Physical Exam   Triage vitals:  T 36.8 °C (98.2 °F)  HR 86  /76  RR 16  O2 97 %      Physical Exam  Constitutional:       Appearance: Normal appearance.   HENT:      Head: Normocephalic and atraumatic.   Eyes:      Extraocular Movements: Extraocular movements intact.      Pupils: Pupils are equal, round, and reactive to light.   Cardiovascular:      Rate and Rhythm: Normal rate and regular rhythm.   Pulmonary:      Effort: Pulmonary effort is normal.      Breath sounds: Normal breath sounds.   Abdominal:      General: Abdomen is flat.      Palpations: Abdomen is soft.   Musculoskeletal:         General: No swelling or signs of injury. Normal range of motion.   Neurological:      General: No focal deficit present.      Mental Status: She is alert and oriented to person, place, and time.         Medical Decision Making & ED Course   Medical Decision Makin y.o. female with past medical history significant for HLD presenting to the emergency department for transient painless mono ocular vision loss of the right eye. Since this episode, patient's vision  has returned completely and she is now asymptomatic. Given history concerning for amaurosis fugax, neurology and ophthalmology consulted. Ophthalmology is in agreement that episode is likely representative of transient mononuclear vision loss and recommended neuro evaluation as well as duplex carotid ultrasounds. On neurology exam, they are also in agreement that symptoms likely related to amaurosis fugax given symptoms now completely resolved. Neurology initially asking for TIA workup to include CTA head and neck, as well as lipid panel and A1c. Given the need for additional TIA workup, patient was planned for admission to clinical observation unit. Unfortunately, this unit was at max capacity and to complete TIA workup, patient would need admitted to the hospital. Discussed admission to neurology service for further workup which was deferred by neurology attending.  Given constraints on the emergency department and inability to perform screening exams, discussion was held with neurology over secure chat via EMR as well as in person with senior and [place] resident who (with joint decision-making with the patient) decided that patient would be discharged home following CTA of head and neck with prescription for ASA 81 mg daily and 40 mg atorvastatin with neurology follow-up given CTA findings are WNL. Considered echocardiogram and ultrasound duplex of bilateral carotids which were suggested by ophthalmology. Ophthalmology was contacted via EMR as well on whether they would like to admit patient for bilateral carotid ultrasounds for which they also deferred to neurology recommendations. As a result, patient was signed out to oncoming provider with plan to complete CTA and if negative discharge home with ASA prescription and atorvastatin with neurology follow-up.    ED Course:  ED Course as of 08/25/24 1935   Sun Aug 25, 2024   1626 Previous laboratory and imaging workup at outside hospital negative. CT head shows no  evidence of bleeding or stroke. Laboratory workup without severe electrolyte derangements, anemia or leukocytosis. [JELENA]      ED Course User Index  [JELENA] Ruperto Martinez MD         Diagnoses as of 08/25/24 1935   Amaurosis fugax of right eye       ----    EKG Independent Interpretation: EKG interpreted by myself. Please see ED Course for full interpretation.    Independent Result Review and Interpretation: Relevant laboratory and radiographic results were reviewed and independently interpreted by myself.  As necessary, they are commented on in the ED Course.    Chronic conditions affecting the patient's care: As documented above in MDM    The patient was discussed with the following consultants/services: As described in MDM      Disposition   Patient was signed out pending completion of their work-up.  Please see the next provider's transition of care note for the remainder of the patient's care.     Procedures   Procedures    Patient seen and discussed with ED attending physician.    Shaun Martinez MD  Emergency Medicine, PGY-2      Ruperto Martinez MD  Resident  08/25/24 1951

## 2024-08-25 NOTE — CONSULTS
Inpatient consult to Neurology  Consult performed by: Damien Lake MD  Consult ordered by: Wai Ortiz MD MPH      Consulted for: Amaurosis fugax    History Of Present Illness  Sherron Correa is a 84 y.o. female with history of HLD presenting with complaints of painless vision loss in her right eye lasting approximately 10 minutes and self resolving earlier this morning.  She initially presented to [facility] and was recommended to come to [facility] for further evaluation of possible amaurosis fugax.  Seen by ophthalmology in the ED who have low suspicion for GCA.    Patient reports that she woke up this morning and had complete vision loss of her right eye.  Reports that it lasted approximately 5 minutes before it started seeming very patchy and came back to normal.  She denies this happening before.  Denies any trauma to the eye, headache, lightheadedness, dizziness, nausea or vomiting.  No prior history of stroke and she is not currently on blood thinners. Reports she used to be on ASA but she does not remember indication and it was discontinued. She is on Atorvastatin 20mg.    From chart review, the patient previously followed with neurology for ocular migraines 2 years ago. She was initially on topiramate and atenolol but this was stopped by her cardiologist. These episodes were described as visual display lasting about 15 minutes.    In the ED:  Vitals on presentation blood pressure 137/78, heart rate 84, pulse ox 97% on room air.  CMP notable for bicarbonate 22  CT head without contrast with no evidence of infarction or hemorrhage    Had stroke symptoms resolved at time of presentation: Yes  Past Medical History  Past Medical History:   Diagnosis Date    Altered mental status, unspecified 03/17/2022    Mental status alteration    Body mass index (BMI) 23.0-23.9, adult 11/19/2021    BMI 23.0-23.9, adult    Body mass index (BMI) 24.0-24.9, adult 06/18/2021    BMI 24.0-24.9, adult    Body mass index  (BMI) 24.0-24.9, adult 02/22/2022    BMI 24.0-24.9, adult    Chronic kidney disease, stage 1 04/09/2018    Stage 1 chronic kidney disease    Chronic rhinitis 02/09/2015    Rhinitis    Encounter for follow-up examination after completed treatment for conditions other than malignant neoplasm 03/17/2022    Hospital discharge follow-up    Hypo-osmolality and hyponatremia 03/18/2022    Hyponatremia    Other conditions influencing health status     No significant past medical history    Other conditions influencing health status 04/09/2018    History of cough    Other decreased white blood cell count 04/16/2019    Other decreased white blood cell (WBC) count    Other diseases of vocal cords 03/07/2015    Vocal cord dysfunction    Other female genital prolapse 01/13/2022    Pelvic floor weakness    Other malaise 08/14/2019    Malaise and fatigue    Other specified symptoms and signs involving the circulatory and respiratory systems 06/29/2015    Chronic throat clearing    Other voice and resonance disorders 04/09/2018    Other voice disturbance    Pelvic and perineal pain 11/15/2021    Pelvic pressure in female    Pelvic and perineal pain 06/23/2022    Female pelvic pain    Personal history of other diseases of the female genital tract 01/13/2022    History of vaginitis    Personal history of other diseases of the nervous system and sense organs 08/02/2019    History of blepharitis    Personal history of other diseases of the nervous system and sense organs 08/30/2022    History of benign essential tremor    Personal history of other diseases of the respiratory system 02/09/2015    History of sinusitis    Personal history of other infectious and parasitic diseases 01/01/2021    History of gram negative infection    Personal history of other specified conditions 10/28/2021    History of urinary frequency    Personal history of other specified conditions 03/17/2022    History of bradycardia    Personal history of other  "specified conditions 03/17/2022    History of palpitations    Personal history of other specified conditions 04/09/2018    History of voice disturbance    Personal history of urinary (tract) infections 04/09/2018    History of recurrent urinary tract infection    Shortness of breath 10/26/2021    Short of breath on exertion    Unspecified subjective visual disturbances 08/02/2019    Disturbance, visual, subjective    Unspecified symptoms and signs involving the genitourinary system 10/23/2020    Urinary symptom or sign    Unspecified symptoms and signs involving the genitourinary system 04/30/2021    Urinary symptom or sign    Urinary tract infection, site not specified 09/16/2021    Recurrent UTI    Urinary tract infection, site not specified 03/03/2022    Acute UTI     Surgical History  Past Surgical History:   Procedure Laterality Date    MR HEAD ANGIO WO IV CONTRAST  10/13/2020    MR HEAD ANGIO WO IV CONTRAST [facility]    OTHER SURGICAL HISTORY  03/02/2015    History Of Prior Surgery     Social History  Social History     Tobacco Use    Smoking status: Never    Smokeless tobacco: Never   Substance Use Topics    Alcohol use: Not Currently    Drug use: Never     Allergies  Nitrofurantoin  Home Medications  (Not in a hospital admission)    Physical Exam  Last Recorded Vitals  Blood pressure 153/77, pulse 76, temperature 36.8 °C (98.2 °F), temperature source Oral, resp. rate 15, height 1.6 m (5' 3\"), weight 64.9 kg (143 lb), SpO2 98%.    GENERAL APPEARANCE:  No distress, alert, interactive and cooperative.      MENTAL STATE:   Orientation was normal to time, place and person. Recent and remote memory was intact.     CRANIAL NERVES:   CN 2   Visual fields full to confrontation.   CN 3, 4, 6   Pupils dilated by ophthalmology, 8 mm in diameter, minimally reactive to light. Lids symmetric; no ptosis. EOMs normal alignment, full range with normal saccades, pursuit and convergence.   No nystagmus.   CN 5 "   Facial sensation intact bilaterally.   CN 7   Normal and symmetric facial strength. Nasolabial folds symmetric.   CN 8   Hearing intact to conversation.  CN 9/10  Palate elevates symmetrically.   CN 11   Normal strength of shoulder shrug and neck turning.   CN 12   Tongue midline, with normal bulk and strength; no fasciculations.     MOTOR:   Muscle bulk and tone were normal in both upper and lower extremities.   No fasciculations, tremor or other abnormal movements were present.                         R          L  Deltoids       5          5  Biceps          5          5  Triceps          5          5  Wrist Flex      5          5  Wrist Ext       5          5    Hip Flex        5          5  Knee Flex      5          5  Knee Ext       5          5  Dorsiflex       5          5  Plantarflex    5          5    REFLEXES:                       R          L  BR:               2          2  Biceps:         2          2  Triceps:        2          2  Knee:           2          2  Ankle:          2          2    No clonus or other pathologic reflexes present.     SENSORY:   In both upper and lower extremities, sensation was intact to light touch.    COORDINATION:    In both upper extremities, finger-nose-finger was intact without dysmetria or overshoot.     GAIT:   Station was stable with a normal base. Gait was stable with a normal arm swing and speed.     Assessment & Plan  Amaurosis fugax of right eye  84-year-old female with history of hyperlipidemia presenting with painless vision loss in her right eye lasting approximately 10 minutes.  CTH negative for acute stroke or hemorrhage. Given short lasting symptoms and description of painless loss of vision, this episode likely represents amaurosis fugax. Given low risk patient, ABCD2 score of 1, will follow algorithm for workup. CTH and CTA H/N with outpatient follow up. Will start ASA and increase statin.    Type: TIA  Subtype/etiology: Unknown  Vessels involved:  Ophthalmic artery  Neurological manifestations:  NIHSS (worst at presentation): 0   Diagnostic evaluation: CTH negative  Antiplatelet/antithrombotic plan for stroke prevention: ASA     Vascular Risk Factor modification goals:  Blood pressure goals: avoid hypotension SBP <100 that could worsen cerebral perfusion,  normotensive  Lipid Goals: education on healthy diet and statin therapy to maintain or achieve goal LDL-cholesterol < 70mg  Glucose Goals: early treatment of hyperglycemia to goal glucose 140-180 mg/dl with long-term goal A1c < 7%   Smoking Cessation and Education  Assessment for Rehabilitation needs   Patient and family education on signs and symptoms of stroke, calling 911, healthy strategies for stroke prevention.      Plan:  #Amaurosis fugax  #TIA  :: Last LDL 3/2023: 92  :: Last A1c 2020: 5.6  :: ABCD2 score 1  - Lipid panel  - A1c  - Start ASA 81mg daily and increase Atorvastatin to 40mg daily  - EKG  - CTA Head and Neck  - Follow up outpatient with stroke neurology    Patient staffed with Dr. Mendieta who agrees with plan.    Damien Lake MD  PGY2 Neurology    ------------------------------------------------  NEUROLOGY SENIOR RESIDENT STAFFING NOTE    This is an 84-year-old female with a history of hyperlipidemia that presents for episode of amaurosis fugax.  Today she had a 5-minute episode of right eye vision loss without pain.  Blood pressure on arrival at outside hospital was 137/78.  Over no jaw claudication, fevers, chills, temple tenderness, musculoskeletal tenderness in shoulders or neck.      On exam, cranial nerves were intact with no evidence of motor or sensory deficits.  Normal gait.  Funduscopic exam without evidence of optic disc edema and normal-appearing vessels.    Impression:  84-year-old female presents with 5-minute episode of right eye vision loss.  ABCD2 score of 1 (age 2) therefore this is low risk TIA.  CT head personally reviewed without acute intracranial abnormalities.   CTA head and neck obtained without evidence of significant carotid or intracranial stenosis.  Subsequently recommend initiating aspirin 81 mg and atorvastatin 40 mg.  Patient to follow-up with neurology outpatient.

## 2024-08-30 ENCOUNTER — APPOINTMENT (OUTPATIENT)
Dept: UROLOGY | Facility: CLINIC | Age: 84
End: 2024-08-30
Payer: MEDICARE

## 2024-08-30 DIAGNOSIS — N81.11 CYSTOCELE, MIDLINE: ICD-10-CM

## 2024-08-30 DIAGNOSIS — R31.29 MICROSCOPIC HEMATURIA: ICD-10-CM

## 2024-08-30 DIAGNOSIS — N39.0 CHRONIC UTI: Primary | ICD-10-CM

## 2024-08-30 DIAGNOSIS — N18.1 CHRONIC RENAL FAILURE, STAGE 1: ICD-10-CM

## 2024-08-30 DIAGNOSIS — N95.2 VAGINAL ATROPHY: ICD-10-CM

## 2024-08-30 DIAGNOSIS — N39.0 RECURRENT URINARY TRACT INFECTION: ICD-10-CM

## 2024-08-30 DIAGNOSIS — R10.2 PELVIC PAIN: ICD-10-CM

## 2024-08-30 DIAGNOSIS — L90.0 LICHEN SCLEROSUS: ICD-10-CM

## 2024-08-30 DIAGNOSIS — N81.89 PELVIC FLOOR WEAKNESS: ICD-10-CM

## 2024-08-30 DIAGNOSIS — R35.81 NOCTURNAL POLYURIA: ICD-10-CM

## 2024-08-30 LAB
POC APPEARANCE, URINE: CLEAR
POC BILIRUBIN, URINE: NEGATIVE
POC BLOOD, URINE: ABNORMAL
POC COLOR, URINE: YELLOW
POC GLUCOSE, URINE: NEGATIVE MG/DL
POC KETONES, URINE: NEGATIVE MG/DL
POC LEUKOCYTES, URINE: ABNORMAL
POC NITRITE,URINE: NEGATIVE
POC PH, URINE: 6 PH
POC PROTEIN, URINE: NEGATIVE MG/DL
POC SPECIFIC GRAVITY, URINE: 1.02
POC UROBILINOGEN, URINE: 0.2 EU/DL

## 2024-08-30 PROCEDURE — 99214 OFFICE O/P EST MOD 30 MIN: CPT | Performed by: OBSTETRICS & GYNECOLOGY

## 2024-08-30 PROCEDURE — 81003 URINALYSIS AUTO W/O SCOPE: CPT | Performed by: OBSTETRICS & GYNECOLOGY

## 2024-08-30 PROCEDURE — G2211 COMPLEX E/M VISIT ADD ON: HCPCS | Performed by: OBSTETRICS & GYNECOLOGY

## 2024-08-30 PROCEDURE — 52000 CYSTOURETHROSCOPY: CPT | Performed by: OBSTETRICS & GYNECOLOGY

## 2024-08-30 RX ORDER — CEFDINIR 300 MG/1
300 CAPSULE ORAL DAILY
Qty: 90 CAPSULE | Refills: 1 | Status: SHIPPED | OUTPATIENT
Start: 2024-08-30 | End: 2025-08-30

## 2024-08-30 NOTE — PROGRESS NOTES
Subjective   Patient ID: Sherron Correa is a 84 y.o. female who presents for follow up  HPI  84-year-old with chronic urinary tract infections, vaginal atrophy and lichen sclerosis, pelvic floor weakness and pain, constipation, and asymptomatic mild cystocele with history of nocturnal polyuria having failed DDAVP therapy due to hyponatremia and having had tachycardia associated with 50 mg imipramine with worsening pelvic pain and microscopic hematuria presenting with acute urinary tract infection presenting for cystoscopic evaluation today 8/30/2024    The patient was recently hospitalized due to her vision changes, possibly due to a TIA.    She denies any UTI like symptoms. She has not been utilizing her daily cefdinir. Her tissues around the urethra are raw and irritated.     She denies any vaginal complaints, no abnormal bleeding or discharge.      She has no other complaints.     From Previous note  84-year-old with chronic urinary tract infections, vaginal atrophy and lichen sclerosis, pelvic floor weakness and pain, constipation, and asymptomatic mild cystocele with history of nocturnal polyuria having failed DDAVP therapy due to hyponatremia and having had tachycardia associated with 50 mg imipramine with worsening pelvic pain and microscopic hematuria presenting for cystoscopic evaluation today 8/23/2024    Unfortunately the patient's urine is grossly infected.    Patient continues to note episodic urgency and frequency.    From Previous note  84-year-old with chronic urinary tract infections, vaginal atrophy and lichen sclerosis, pelvic floor weakness and pain, constipation, and asymptomatic mild cystocele with history of nocturnal polyuria having failed DDAVP therapy due to hyponatremia and having had tachycardia associated with 50 mg imipramine.    The patient appears to be noting soreness, heaviness and rash and her vaginal opening. She has been utilizing Vaseline and vaginal estrogen cream. She  "occasionally utilizes nystatin. She is not utilizing her clobetasol therapy. She denies any v abnormal bleeding or discharge.     She denies any UTI like symptoms. Her UA is negative, her culture forma  week ago was negative as well.    She denies any vaginal complaints, no abnormal bleeding or discharge.     She denies any bowel related complaints, no fecal or flatal incontinence.    She has no other complaints.        From Previous note  84-year-old with chronic urinary tract infections, vaginal atrophy and lichen sclerosis, pelvic floor weakness and pain, constipation, and asymptomatic mild cystocele with nocturnal polyuria concerns having failed DDAVP therapy due to hyponatremia and having had tachycardia associated with 50 mg imipramine.     The patient  has been noting improvements in her nighttime frequency and urgency  and now notes 0-1 episodes of nocturias. She has not been utilizing any medication. She denies any UTI like symptoms.  She has been UTI free for the last year.  She is continuing her daily cefdinir therapy.    She does note vaginal irritation and \"rawness\" particularly when she wipes.  She notes this for at least the last 3 to 4 weeks.  She states that she has utilized nystatin for \"a few days\" and then clobetasol \"for a few days\" without significant changes to her irritative concerns.  She is utilizing \"baby soap\" vaginally.  She is not utilizing the vaginal estrogen cream. She is not utilizing her clobetasol therapy. She denies any v abnormal bleeding or discharge.     She denies any bowel related complaints, no fecal or flatal incontinence.    She has no other complaints.       From Previous note  This visit was performed through telemedicine  83-year-old with chronic urinary tract infections, vaginal atrophy and lichen sclerosis, pelvic floor weakness and pain, constipation, and asymptomatic mild cystocele with nocturnal polyuria concerns having failed DDAVP therapy     The patient had " been utilizing the imipramine with benefits in her nighttime lower urinary tract symptoms, however she started having tachycardia and hence since stopped it. She wishes to try this medication in a lower dose for her night time symptoms. She voids every 2-3 hours during the day with all small volume voids. The possibility of utilizing the Gemtesa therapy was readdressed. She denies any UTI like symptoms. She is continuing her cefdinir for daily prophylaxis. She will continue her 3 times a week vaginal estrogen therapy.     She denies any vaginal complaints, no abnormal vaginal bleeding or discharge.      She denies any bowel related complaints, no fecal or flatal incontinence.     She has no other complaints.      From previous note  This visit was performed through telemedicine  83-year-old with chronic urinary tract infections, vaginal atrophy and lichen sclerosis, pelvic floor weakness and pain, constipation, and asymptomatic mild cystocele with concerns for UTI and urinary urgency and frequency with nocturia complaints.     The patient appears to be doing well since stopping her DDAVP, she however states her urine is cloudy in the morning but denies any other UTI like symptoms. She notes 3-4 episodes of nocturia but denies any enuresis. She voids every 2-3 hours during the day with all small volume voids. She has not been utilizing the Gemtesa therapy.      She denies any bowel related complaints, no fecal or flatal incontinence.     She denies any vaginal complaints, no abnormal vaginal bleeding or discharge.      She has no other complaints.      From previous note  This visit was performed through telemedicine  83-year-old with chronic urinary tract infections, vaginal atrophy and lichen sclerosis, pelvic floor weakness and pain, constipation, and asymptomatic mild cystocele with concerns for UTI and urinary urgency and frequency with nocturia complaints.     The patient presents to discuss her voiding diary,  "she does not appear to be consuming fluids before going to sleep, she notes 2-3 episodes of nocturia with all large volume voids. Her daytime frequency is roughly every 3 hours in the 6 to 8 ounce range. However overnight she awakens 2-3 times with voided volumes in the 14 to 16 ounce range. She does however note some lower extremity edema and snoring. She denies any UTI like symptoms.      She denies any bowel related complaints, no fecal or flatal incontinence.     She denies any vaginal complaints, no abnormal vaginal bleeding or discharge.      She has no other complaints.      From previous note  83-year-old with chronic urinary tract infections, vaginal atrophy and lichen sclerosis, pelvic floor weakness and pain, constipation, and asymptomatic mild cystocele presenting with concerns for an active UTI.     The patient's urinalysis today appears infected. Of note her urine culture from 5/1/2023 was negative. However the patient has noted persistent urgency and frequency complaints.     She is no longer utilizing her Gemtesa therapy. She does note 2-3 episodes of nocturia with \"large volumes\". She notes daytime frequency and small volume voids.     She has no bowel related complaints. She denies any abnormal vaginal discharge or bleeding.     From previous note  82-year-old with history of chronic urinary tract infections, vaginal atrophy, and lichen sclerosus presenting for follow-up.     She denies any UTI-like symptoms today. Urinalysis appears negative. She is continuing her cefdinir therapy daily. She is also utilizing her vaginal estrogen therapy 2-3 times a week.      Unfortunately over the last 4 weeks the patient has noted persistent and worsening irritative vaginal complaints. She notes some redness to her vaginal tissues and has been utilizing her clobetasol daily because of this. She was instructed last week to stop this medication and she presents today for a pelvic exam.     She has followed up " with pelvic floor physical therapy and feels that this is of benefit.     She does note some mild urinary urgency and frequency roughly every 1-2 hours. She notes 2 episodes of nocturia.     She has no other complaints.     From previous note  81-year-old presenting as referral from Dr. Weston with concerns for chronic urinary tract infections.     The patient has unfortunately been hospitalized roughly 2 to 3 months ago with a Pseudomonas UTI. She has had recurrent Pseudomonas UTIs and Enterococcus faecalis over the last 2 to 3 months. She is presently on amoxicillin for her most recent UTI diagnosed 1/25/2021. She has noted some improvement in her urinary urgency and nocturia complaints while taking this antibiotic. Sensitivities demonstrate the appropriateness of this antibiotic.     The patient states that prior to her urinary tract infections that she had no significant lower urinary tract complaints. She noted daytime urgency every 3-4 hours and 1-2 episodes of nocturia. However during her infections she notes cloudy urine with worsening urgency and frequency and worsening nocturia complaints. She denies any stress urinary incontinence complaints. She denies a history of nephrolithiasis or any gross hematuria.     She is not utilizing any over-the-counter medications. She recently started vaginal estrogen therapy.     She denies any bowel related complaints. She denies any fecal or flatal incontinence.     She is sexually active but only rarely. She denies any abnormal vaginal bleeding or discharge. She denies any prolapse complaints.     She has no other complaints.     Review of Systems  Constitutional: No fever, No chills and No fatigue.   Eyes: No vision problems and No dryness of the eyes.   ENT: No dry mouth, No hearing loss and No nosebleeds.   Cardiovascular: No chest pain, No palpitations and No orthopnea.   Respiratory: No shortness of breath, No cough and No wheezing.   Gastrointestinal: No  abdominal pain, No constipation, No nausea, No diarrhea, No vomiting and No melena.   Genitourinary: As noted in HPI.   Musculoskeletal: No back pain, No myalgias, No muscle weakness, No joint swelling and No leg edema.   Integumentary: No rashes, No skin lesion and No itching.   Neurological: No headache, No numbness and No dizziness.   Psychiatric: No sleep disturbances, No anxiety and No depression.   Endocrine: No hot flashes, No loss of hair and No hirsutism.   Hematologic/Lymphatic: No swollen glands, No tendency for easy bleeding and No tendency for easy bruising.   All other systems have been reviewed and are negative for complaint.        Objective   Physical Exam  PHYSICAL EXAMINATION:  No LMP recorded. Patient is postmenopausal.  There is no height or weight on file to calculate BMI.  There were no vitals taken for this visit.  General Appearance: well appearing  Neuro: Alert and oriented   HEENT: mucous membranes moist, neck supple  Resp: No respiratory distress, normal work of breathing  MSK: normal range of motion, gait appropriate    After consent was signed and placed in the chart the patient was prepped with iodine and lidocaine gel.  A flexible cystoscope was then introduced into the bladder.  There was a wide mouth and shallow diverticulum noted at the dome of the bladder.  There were no other abnormalities of the bladder wall.  Normal-appearing ureteral orifices in the normal orthotopic position and no abnormalities on retroflexion.  The urethra appeared normal.  She will continue her cefdinir therapy at home.    Assessment/Plan     84-year-old with chronic urinary tract infections, vaginal atrophy and lichen sclerosis, pelvic floor weakness and pain, constipation, and asymptomatic mild cystocele with history of nocturnal polyuria having failed DDAVP therapy due to hyponatremia and having had tachycardia associated with 50 mg imipramine with worsening pelvic pain and microscopic hematuria  presenting with acute urinary tract infection presenting for cystoscopic evaluation today 8/30/2024      #1 reassuring cystoscopic evaluation today 8/30/2024.  We have previously discussed the patient's chronic urinary tract infections and how this may affect her success with Prolia. We have previously discussed that her present therapy should put her at equal to lower risk of development of a UTI moving forward. She will continue her d-mannose therapy and cranberry extract tablets. She was previously provided a standing order for urinalysis and urine culture moving forward. She did have a reassuring upper tract ultrasound 11/17/2020 and most recently 8/21/2024.  She has previously had mildly elevated postvoid residuals but recent PVRs have been reassuring.  She will be restarted on daily cefdinir now.  She will continue her vaginal estrogen therapy 3 times a week moving forward.    #2 we have previously discussed the patient's weak and painful pelvic floor.  We discussed the importance of following up with repeat pelvic floor physical therapy given her pelvic heaviness and pressure complaints.  She was previously provided a new referral and list of providers.  She has not had the opportunity to follow-up.     #3 The patient never started her Gemtesa therapy as she was concerned this may cause fluid retention due to her previously noted large volume output nocturnal voids. We had previously discussed her voiding diary noting concerns for nocturnal polyuria. We discussed utilizing compression socks for her lower extremity edema and foot elevation. We again discussed the possibility of sleep medicine referral given her concerns for sleep apnea she is not interested in this at this time. DDAVP caused significant hyponatremia and has been stopped. BMP has normalized. 50 mg imipramine caused tachycardia concerns and she has since stopped this. However she did feel that this gave her significant benefits at nighttime.   "She has since stopped her imipramine and Gemtesa therapy.     #4 we discussed the patient's recent microscopic hematuria concerns.  She has noted some \"cloudy urine\".  As noted above, 8/21/2024 kidney ultrasound was reassuring and negative.  As above, her cystoscopic evaluation today 8/30/2024 was normal.    #5 we again discussed the patient's vaginal irritation complaints.  We discussed stopping her vaginal baby soap now.  She will continue her vaginal estrogen therapy  3 times a week.  We discussed the signs and symptoms of a yeast vaginitis.  Vaginal exam today 8/30/2024 notes no significant erythema or discharge from the vagina.  We discussed using clobetasol therapy on an as-needed basis moving forward.     6. We have previously discussed her constipation complaints. We discussed the benefits of daily fiber therapy and MiraLAX. She'll titrate this to a soft bowel movement every day to 2 days.     7.  The patient will restart her daily cefdinir therapy.  She is being treated by neurology due to her recent hospitalization and vision changes.  We will reevaluate her lower urinary tract symptoms in 1 month to discuss possible third line therapeutic options.     BRYNN Kelley MD     Scribe Attestation  By signing my name below, I, Rohit Granados attest that this documentation has been prepared under the direction and in the presence of Ld Kelley MD. All medical record entries made by the Scribe were at my direction or personally dictated by me. I have reviewed the chart and agree that the record accurately reflects my personal performance of the history, physical exam, discussion and plan.    "

## 2024-08-30 NOTE — PATIENT INSTRUCTIONS
Please continue your clobetasol therapy as needed.    Please continue your vaginal estrogen there nightly 3 times a week.    Please restart  your cefdinir therapy now.    You have been provided a standing order for urinalysis and urine culture.  Please present to any [facility] lab should you have any UTI-like symptoms.    Please stop all vaginal irritants including soaps for at least the next week.    Please follow-up in 4 weeks to discuss your lower urinary tract symptoms.    [phone]

## 2024-09-16 ENCOUNTER — EVALUATION (OUTPATIENT)
Dept: PHYSICAL THERAPY | Facility: CLINIC | Age: 84
End: 2024-09-16
Payer: MEDICARE

## 2024-09-16 DIAGNOSIS — N81.89 PELVIC FLOOR WEAKNESS: ICD-10-CM

## 2024-09-16 DIAGNOSIS — R10.2 PELVIC PAIN: ICD-10-CM

## 2024-09-16 PROCEDURE — 97530 THERAPEUTIC ACTIVITIES: CPT | Mod: GP | Performed by: PHYSICAL THERAPIST

## 2024-09-16 PROCEDURE — 97162 PT EVAL MOD COMPLEX 30 MIN: CPT | Mod: GP | Performed by: PHYSICAL THERAPIST

## 2024-09-16 PROCEDURE — 97112 NEUROMUSCULAR REEDUCATION: CPT | Mod: GP | Performed by: PHYSICAL THERAPIST

## 2024-09-16 NOTE — PROGRESS NOTES
Physical Therapy Pelvic Floor Evaluation    Patient Name: Sherron Crorea  MRN: [id number]  Evaluation Date: 9/16/2024  Time Calculation  Start Time: 1230  Stop Time: 1330  Time Calculation (min): 60 min  PT Evaluation Time Entry  PT Evaluation (Moderate) Time Entry: 30     PT Therapeutic Procedures Time Entry  Neuromuscular Re-Education Time Entry: 15  Therapeutic Activity Time Entry: 8         Problem List Items Addressed This Visit             ICD-10-CM    Pelvic floor weakness N81.89    Pelvic pain R10.2      Subjective    Precautions:  Precautions  Precautions Comment: no precautions  Osteoporosis    Pain:     0-4/10 heaviness slipping    PELVIC HISTORY:  Chief Complaint/Description of Symptoms:   Has been seeing Dr for Has a tendency to UTI, Has a soreness with burning,  skin is more raw.   muscles are weakening,   feels heavy -- ache down there.    Past Medical History  Osteoporosis, frequent UTI  Home Environment/Social Factors/Occupation:   Active   Patient Primary Goal:   Reduce heavy sensation;    PELVIC PAIN:     Location: pelvic lower abdominal   Pain with going to bathroom,  feels like skin is raw;    Using estrogen cream, nystatin ointment    BLADDER:     Daytime Voiding Frequency: every 2-3 hours;    Nighttime Voiding Frequency: 2 times a night;    Able to completely empty bladder: sometimes feels like she hasn't gotten it all out;    Frequent UTI's: yes  No urinary incontinence;   Stops drinking at 630-7    BOWEL:     BM Frequency: daily -- but can be hard  Frequent constipation/straining/incomplete emptying: sometimes constipated;      FLUID/DIET INTAKE:  32 ounces,     EXERCISE:  Current exercise regime:   Walks -- sometimes silver sneakers.      Objective   POSTURE/ALIGNMENT:  Reduced lumbar lordosis,        ROM:  Lumbar ROM Range   Flexion 50%   Extension 50%     Hip AROM L R   All hip ROM WFL deg WFL deg      MMT:  Hip MMT L R   Hip Flexion 4/5 4/5   Hip Extension(bridge) 4-/5 4-/5    Hip Abduction 4/5 4/5      TA: poor TA activation, poor load transfer, limited diaphragmatic breathing    FLEXIBILITY R/L:  Hamstrings: mild/mild    EXTERNAL MUSCLE PALPATION:  Abdominals: no pain with palpation     PELVIC FLOOR  Patient Position:  Hooklying   EXTERNAL OBSERVATION:  Voluntary Contraction: uses a lot of gluteal   Vulvar Assessment: redness vulva and vagina  EXTERNAL PALPATION:  Levator Ani: non tender   Bulbo: non tender   Ischiocavernosus: non tender   Transverse perineum: thickness at perineal body     INTERNAL VAGINAL EXAMINATION WITH PATIENT CONSENT:  Patient position:  hooklying  Internal:  Superficial layer mm   Tender more superficial because of rawness per patient   Deep layer:  Non tender  Strength  0-1/5 with proper breathing  Prolapse  + cystocele     Outcome Measures:  NIH-CPSI: 18  Pain: 9  Urinary: 2  Quality of Life Impact: 7     Treatments:  Neuro Re-education:    Breathing, kegel  Therapeutic Activity:  Discussed skin integrity-- consider using A&D ointment or Aquaphor to assist as skin barrier.      OP EDUCATION:  Outpatient Education  Individual(s) Educated: Patient  Education Provided: Anatomy, Home Exercise Program, Physiology, POC  Risk and Benefits Discussed with Patient/Caregiver/Other: yes  Patient/Caregiver Demonstrated Understanding: yes  Plan of Care Discussed and Agreed Upon: yes  Patient Response to Education: Patient/Caregiver Verbalized Understanding of Information, Patient/Caregiver Performed Return Demonstration of Exercises/Activities, Patient/Caregiver Asked Appropriate Questions    Assessment     PT Assessment Results: Decreased strength, Decreased range of motion, Decreased coordination, Pain  Rehab Prognosis: Good  Evaluation/Treatment Tolerance: Patient tolerated treatment well    Pt is a 84 y.o. female who presents with impairments of weakness and pain and impaired skin integrity.  Pt would benefit from skilled physical therapy intervention to improve above  impairments and facilitate return to function.     Plan:  Treatment/Interventions: Biofeedback, Cryotherapy, Dry needling, Education/ Instruction, Electrical stimulation, Manual therapy, Therapeutic activities, Therapeutic exercises, Neuromuscular re-education  PT Plan: Skilled PT  PT Frequency: 1 time per week  Duration: 10 sessions  Certification Period Start Date: 09/16/24  Certification Period End Date: 12/15/24  Number of Treatments Authorized: 60 PT/OT  Rehab Potential: Good  Plan of Care Agreement: Patient    Next session:  strengthening,     Goals:  Active       Pelvic Floor Goals       STGs - 5 sessions       Start:  09/16/24    Expected End:  10/31/24       1)  Patient will report 25% less pain/symptoms during ADLs  2)  Patient will perform diaphragmatic breathing properly to relax and contract pelvic floor muscle appropriately  3)  Patient will demonstrate good transverse abdominis activation to stabilize lumbopelvic girdle during ADLs                        LTGs - 10 sessions       Start:  09/16/24    Expected End:  12/15/24       1)  Patient will report nil heaviness pain by end of night after ADLs  2)  Patient will improve pelvic floor contraction to by 1/2-1 MMT  with coordinated exhale for improved support for bladder and rectum.    3)  Patient will improve LE strength by 1/2 MMT grade to support pelvic floor during ADLs.    4)  Patient will be knowledgeable with regards to skin integrity and ways to improve and promote better vulvar skin health.           Patient Stated Goal 1       Start:  09/16/24    Expected End:  12/15/24       Patient wants to strengthen pelvic floor

## 2024-09-17 PROBLEM — N81.89 PELVIC FLOOR WEAKNESS: Status: ACTIVE | Noted: 2024-09-17

## 2024-09-17 PROBLEM — R10.2 PELVIC PAIN: Status: ACTIVE | Noted: 2024-09-17

## 2024-09-23 ENCOUNTER — LAB (OUTPATIENT)
Dept: LAB | Facility: LAB | Age: 84
End: 2024-09-23
Payer: MEDICARE

## 2024-09-23 DIAGNOSIS — N18.1 CHRONIC RENAL FAILURE, STAGE 1: ICD-10-CM

## 2024-09-23 DIAGNOSIS — M81.0 AGE-RELATED OSTEOPOROSIS WITHOUT CURRENT PATHOLOGICAL FRACTURE: ICD-10-CM

## 2024-09-23 LAB
ANION GAP SERPL CALC-SCNC: 10 MMOL/L (ref 10–20)
BUN SERPL-MCNC: 15 MG/DL (ref 6–23)
CA-I BLD-SCNC: 1.25 MMOL/L (ref 1.1–1.33)
CALCIUM SERPL-MCNC: 9.6 MG/DL (ref 8.6–10.6)
CHLORIDE SERPL-SCNC: 102 MMOL/L (ref 98–107)
CO2 SERPL-SCNC: 33 MMOL/L (ref 21–32)
CREAT SERPL-MCNC: 0.93 MG/DL (ref 0.5–1.05)
EGFRCR SERPLBLD CKD-EPI 2021: 61 ML/MIN/1.73M*2
GLUCOSE SERPL-MCNC: 87 MG/DL (ref 74–99)
POTASSIUM SERPL-SCNC: 4.5 MMOL/L (ref 3.5–5.3)
SODIUM SERPL-SCNC: 140 MMOL/L (ref 136–145)

## 2024-09-23 PROCEDURE — 82330 ASSAY OF CALCIUM: CPT

## 2024-09-23 PROCEDURE — 80048 BASIC METABOLIC PNL TOTAL CA: CPT

## 2024-09-23 PROCEDURE — 36415 COLL VENOUS BLD VENIPUNCTURE: CPT

## 2024-09-26 NOTE — PROGRESS NOTES
Physical Therapy Treatment    Patient Name: Sherron Correa  MRN: [id number]  Encounter date:  9/27/2024  Time Calculation  Start Time: 0830  Stop Time: 0925  Time Calculation (min): 55 min     PT Therapeutic Procedures Time Entry  Neuromuscular Re-Education Time Entry: 8  Therapeutic Exercise Time Entry: 45    Visit Number:  2 (including evaluation)  Planned total visits: 10 per POC  Visits Authorized/Insurance Coverage:  NO AUTH, 100% COVERAGE, AETNA, ACTIVE SECONDARY  AETNA 60V PT/OT     Current Problem  Problem List Items Addressed This Visit             ICD-10-CM    Pelvic floor weakness N81.89    Pelvic pain R10.2     Precautions     Osteoporosis    Pain     Heaviness isn't always present - currently 0/10  Subjective  General        Heaviness happens when on feet for too  long;  Hasn't tried reduction techniques -- forgot.  Skin is getting better -- using clobetasol ointment.  Feels like emptying bladder bladder better.      Objective  Difficulty coordinating breathing    Treatment:  Therapeutic Exercise:  with coordinated breathing and proper exhale and kegel contraction  Supine:    PPT x 10   Hip adduction with pillow x 10   Hip abduction green x 10   Marching x 10    Sitting:    Hip abduction with green x 10   Hip adduction x 10 pillow   Sit to stand x 2   Standing:    Heel raises x 10   Hip extension x 10   Neuro Re-education:    Reviewed breathing and importance     Current HEP:  Access Code: [email or web address]  URL: [email or web address]  Date: 09/29/2024  Prepared by: Marlyn Bah    Exercises  - Supine Posterior Pelvic Tilt  - 1 x daily - 7 x weekly - 1 sets - 10 reps  - Supine Hip adduction isometric with Ball -- Exercise 1   - 1 x daily - 7 x weekly - 1 sets - 10 reps  - Hooklying Clamshell with Resistance -- Exercise 4   - 1 x daily - 7 x weekly - 1 sets - 10 reps  - Supine March  - 1 x daily - 7 x weekly - 1 sets - 10 reps  - Seated Pelvic Floor Contraction with Isometric Hip Adduction  - 1 x  daily - 7 x weekly - 1 sets - 10 reps  - Seated Pelvic Floor Contraction with Hip Abduction and Resistance Loop  - 1 x daily - 7 x weekly - 1 sets - 10 reps  - Sit to Stand with Pelvic Floor Contraction  - 1 x daily - 7 x weekly - 1 sets - 2 reps  - Heel Raises with Counter Support  - 1 x daily - 7 x weekly - 1 sets - 10 reps  - Standing Hip Extension with Counter Support  - 1 x daily - 7 x weekly - 1 sets - 10 reps    OP EDUCATION:     Updated HEP  Assessment:     Pt's response to treatment:  Patient needs cues for proper breathing mechanics during exercises.  Tends to valsalva with exhale but after proper cueing able to perform correctly.  Per patient skin is improving;  Patient continues to benefit from skilled PT to strengthen and progress towards established physical therapy goals.     Pain end of session: 0/10    Plan:     Continue with current POC/no changes    Assessment of current progress against goals:  Progressing toward functional goals    Goals:  Active       Pelvic Floor Goals       STGs - 5 sessions       Start:  09/16/24    Expected End:  10/31/24       1)  Patient will report 25% less pain/symptoms during ADLs  2)  Patient will perform diaphragmatic breathing properly to relax and contract pelvic floor muscle appropriately  3)  Patient will demonstrate good transverse abdominis activation to stabilize lumbopelvic girdle during ADLs                        LTGs - 10 sessions       Start:  09/16/24    Expected End:  12/15/24       1)  Patient will report nil heaviness pain by end of night after ADLs  2)  Patient will improve pelvic floor contraction to by 1/2-1 MMT  with coordinated exhale for improved support for bladder and rectum.    3)  Patient will improve LE strength by 1/2 MMT grade to support pelvic floor during ADLs.    4)  Patient will be knowledgeable with regards to skin integrity and ways to improve and promote better vulvar skin health.           Patient Stated Goal 1       Start:   09/16/24    Expected End:  12/15/24       Patient wants to strengthen pelvic floor

## 2024-09-27 ENCOUNTER — TREATMENT (OUTPATIENT)
Dept: PHYSICAL THERAPY | Facility: CLINIC | Age: 84
End: 2024-09-27
Payer: MEDICARE

## 2024-09-27 ENCOUNTER — APPOINTMENT (OUTPATIENT)
Dept: UROLOGY | Facility: CLINIC | Age: 84
End: 2024-09-27
Payer: MEDICARE

## 2024-09-27 DIAGNOSIS — N81.89 PELVIC FLOOR WEAKNESS: ICD-10-CM

## 2024-09-27 DIAGNOSIS — R10.2 PELVIC PAIN: ICD-10-CM

## 2024-09-27 PROCEDURE — 97112 NEUROMUSCULAR REEDUCATION: CPT | Mod: GP | Performed by: PHYSICAL THERAPIST

## 2024-09-27 PROCEDURE — 97110 THERAPEUTIC EXERCISES: CPT | Mod: GP | Performed by: PHYSICAL THERAPIST

## 2024-09-30 ENCOUNTER — APPOINTMENT (OUTPATIENT)
Dept: INFUSION THERAPY | Facility: CLINIC | Age: 84
End: 2024-09-30
Payer: MEDICARE

## 2024-09-30 VITALS
TEMPERATURE: 97.6 F | SYSTOLIC BLOOD PRESSURE: 122 MMHG | BODY MASS INDEX: 25.77 KG/M2 | RESPIRATION RATE: 18 BRPM | DIASTOLIC BLOOD PRESSURE: 46 MMHG | WEIGHT: 145.5 LBS | HEART RATE: 77 BPM

## 2024-09-30 DIAGNOSIS — M81.0 AGE-RELATED OSTEOPOROSIS WITHOUT CURRENT PATHOLOGICAL FRACTURE: ICD-10-CM

## 2024-09-30 PROCEDURE — 96372 THER/PROPH/DIAG INJ SC/IM: CPT | Performed by: REGISTERED NURSE

## 2024-09-30 RX ORDER — EPINEPHRINE 0.3 MG/.3ML
0.3 INJECTION SUBCUTANEOUS EVERY 5 MIN PRN
Status: CANCELLED | OUTPATIENT
Start: 2025-03-23

## 2024-09-30 RX ORDER — FAMOTIDINE 10 MG/ML
20 INJECTION INTRAVENOUS ONCE AS NEEDED
Status: CANCELLED | OUTPATIENT
Start: 2025-03-23

## 2024-09-30 RX ORDER — ALBUTEROL SULFATE 0.83 MG/ML
3 SOLUTION RESPIRATORY (INHALATION) AS NEEDED
Status: CANCELLED | OUTPATIENT
Start: 2025-03-23

## 2024-09-30 RX ORDER — DIPHENHYDRAMINE HYDROCHLORIDE 50 MG/ML
50 INJECTION INTRAMUSCULAR; INTRAVENOUS AS NEEDED
Status: CANCELLED | OUTPATIENT
Start: 2025-03-23

## 2024-09-30 ASSESSMENT — ENCOUNTER SYMPTOMS
WOUND: 0
COUGH: 0
SHORTNESS OF BREATH: 0
DIZZINESS: 0
EXTREMITY WEAKNESS: 0
LIGHT-HEADEDNESS: 0
LEG SWELLING: 0
WHEEZING: 0
PALPITATIONS: 0
NUMBNESS: 0

## 2024-09-30 ASSESSMENT — PAIN SCALES - GENERAL: PAINLEVEL: 0-NO PAIN

## 2024-09-30 NOTE — PROGRESS NOTES
[facility]   Infusion Clinic Note   Date: 2024   Name: Sherron Correa  : 1940   MRN: [id number]          Reason for Visit: Injections (Prolia)         Today: We administered denosumab.       Visit Type: INJECTION       Ordered By: Dr Weston       Accompanied by:Self       Diagnosis: Age-related osteoporosis without current pathological fracture        Allergies:   Allergies as of 2024 - Reviewed 2024   Allergen Reaction Noted    Nitrofurantoin Hives 2023          Current Medications has a current medication list which includes the following prescription(s): aspirin, atorvastatin, calcium 26/vit d3/magnesium 15, calcium carbonate-vitamin d3, cefdinir, cholecalciferol (vitamin d3), clobetasol, cranberry conc-ascorbic acid, prolia, estradiol, fish oil concentrate, fluconazole, flonase sensimist, multivitamin, nystatin-triamcinolone, and omega-3 fatty acids-fish oil.       Vitals:   Vitals:    24 0903   BP: (!) 122/46   Pulse: 77   Resp: 18   Temp: 36.4 °C (97.6 °F)   Weight: 66 kg (145 lb 8 oz)   PainSc: 0-No pain             Infusion Pre-procedure Checklist:   - Allergies reviewed: yes   - Medications reviewed: yes       - Previous reaction to current treatment: yes      Assess patient for the concerns below. Document provider notification as appropriate.  - Active or recent infection with/without current antibiotic use: no, on chronic antibiotics   - Recent or planned invasive dental work: no  - Recent or planned surgeries: no  - Recently received or plans to receive vaccinations:  had covid and flu vaccine last tuesday  - Has treatment related toxicities: no  - Is pregnant:  n/a      Pain: 0   - Is the pain different from normal: no   - Is your Doctor aware:  na       Labs: N/A          Fall Risk Screening:         Review Of Systems:  Review of Systems   Respiratory:  Negative for cough, shortness of breath and wheezing.    Cardiovascular:   Negative for chest pain, leg swelling and palpitations.   Skin:  Negative for itching, rash and wound.   Neurological:  Negative for dizziness, extremity weakness, light-headedness and numbness.         ROS completed? Yes      Infusion Readiness:  - Assessment Concerns Related to Infusion: No  - Provider notified: n/a      Document Below Only If Indicated:   New Patient Education:    N/A (returning patient for continuation of therapy. Ongoing education provided as needed.)        Treatment Conditions & Drug Specific Questions:    Denosumab  (PROLIA. XGEVA)    (Unless otherwise specified on patient specific therapy plan):     TREATMENT CONDITIONS:  Unless otherwise specified on patient specific therapy plan HOLD and notify provider prior to proceeding with today's injection if patients:  o Calcium is LESS THAN 8.6 mg/dL OR  Ionized Calcium LESS THAN 1.1 mmol/L  o Recent or planned invasive dental procedure (within 4 weeks)    Lab Results   Component Value Date    CALCIUM 9.6 09/23/2024    PHOS 3.8 09/28/2023      Lab Results   Component Value Date    CAION 1.25 09/23/2024       Labs reviewed and patient meets treatment conditions? Yes    DRUG SPECIFIC QUESTIONS:  Is the patient taking calcium and vitamin D? Yes  (Recommended)    Pt Instructed on following risks: (1) hypocalcemia, (2) osteonecrosis of the jaw, (3) atypical femoral fractures, (4) serious infections, and (5) dermatologic reactions?  Yes      REMINDER:  PREGNANCY CATEGORY X DRUG. OBTAIN NEGTATIVE PREGNANCY TEST PRIOR TO FIRST INFUSION FOR WOMEN OF CHILDBEARING ABILITY   REMS DRUG    Recommended Vitals/Observation:  Vitals: Obtain vitals prior to injection.  Observation: Patient may leave immediately following injection.        Weight Based Drug Calculations:    WEIGHT BASED DRUGS: NOT APPLICABLE / FLAT DOSE          Initiated By: Jana Davis RN

## 2024-09-30 NOTE — PATIENT INSTRUCTIONS
Today :We administered denosumab.     For:   1. Age-related osteoporosis without current pathological fracture         Your next appointment is due in:  6 months        Please read the  Medication Guide that was given to you and reviewed during todays visit.     (Tell all doctors including dentists that you are taking this medication)     Go to the emergency room or call 911 if:  -You have signs of allergic reaction:   -Rash, hives, itching.   -Swollen, blistered, peeling skin.   -Swelling of face, lips, mouth, tongue or throat.   -Tightness of chest, trouble breathing, swallowing or talking     Call your doctor:  - If IV / injection site gets red, warm, swollen, itchy or leaks fluid or pus.     (Leave dressing on your IV site for at least 2 hours and keep area clean and dry  - If you get sick or have symptoms of infection or are not feeling well for any reason.    (Wash your hands often, stay away from people who are sick)  - If you have side effects from your medication that do not go away or are bothersome.     (Refer to the teaching your nurse gave you for side effects to call your doctor about)    - Common side effects may include:  stuffy nose, headache, feeling tired, muscle aches, upset stomach  - Before receiving any vaccines     - Call the [facility] at   If:  - You get sick, are on antibiotics, have had a recent vaccine, have surgery or dental work and your doctor wants your visit rescheduled.  - You need to cancel and reschedule your visit for any reason. Call at least 2 days before your visit if you need to cancel.   - Your insurance changes before your next visit.    (We will need to get approval from your new insurance. This can take up to two weeks.)     The [facility] is opened Monday thru Friday. We are closed on weekends and holidays.   Voice mail will take your call if the center is closed. If you leave a message please allow 24 hours for a call back during  weekdays. If you leave a message on a weekend/holiday, we will call you back the next business day.

## 2024-10-01 ENCOUNTER — OFFICE VISIT (OUTPATIENT)
Dept: NEUROLOGY | Facility: CLINIC | Age: 84
End: 2024-10-01
Payer: MEDICARE

## 2024-10-01 VITALS
HEART RATE: 76 BPM | DIASTOLIC BLOOD PRESSURE: 76 MMHG | BODY MASS INDEX: 25.69 KG/M2 | HEIGHT: 63 IN | SYSTOLIC BLOOD PRESSURE: 122 MMHG | RESPIRATION RATE: 16 BRPM | WEIGHT: 145 LBS | TEMPERATURE: 97.3 F

## 2024-10-01 DIAGNOSIS — F41.9 ANXIETY: ICD-10-CM

## 2024-10-01 DIAGNOSIS — G45.3 AMAUROSIS FUGAX OF RIGHT EYE: Primary | ICD-10-CM

## 2024-10-01 DIAGNOSIS — E78.5 HYPERLIPIDEMIA, UNSPECIFIED: ICD-10-CM

## 2024-10-01 DIAGNOSIS — G45.9 TRANSIENT CEREBRAL ISCHEMIA, UNSPECIFIED TYPE: ICD-10-CM

## 2024-10-01 PROCEDURE — 1159F MED LIST DOCD IN RCRD: CPT | Performed by: STUDENT IN AN ORGANIZED HEALTH CARE EDUCATION/TRAINING PROGRAM

## 2024-10-01 PROCEDURE — 1036F TOBACCO NON-USER: CPT | Performed by: STUDENT IN AN ORGANIZED HEALTH CARE EDUCATION/TRAINING PROGRAM

## 2024-10-01 PROCEDURE — 1126F AMNT PAIN NOTED NONE PRSNT: CPT | Performed by: STUDENT IN AN ORGANIZED HEALTH CARE EDUCATION/TRAINING PROGRAM

## 2024-10-01 PROCEDURE — 99214 OFFICE O/P EST MOD 30 MIN: CPT | Performed by: STUDENT IN AN ORGANIZED HEALTH CARE EDUCATION/TRAINING PROGRAM

## 2024-10-01 RX ORDER — SERTRALINE HYDROCHLORIDE 25 MG/1
25 TABLET, FILM COATED ORAL DAILY
Qty: 30 TABLET | Refills: 11 | Status: SHIPPED | OUTPATIENT
Start: 2024-10-01 | End: 2025-10-01

## 2024-10-01 RX ORDER — ATORVASTATIN CALCIUM 40 MG/1
40 TABLET, FILM COATED ORAL DAILY
Qty: 90 TABLET | Refills: 3 | Status: SHIPPED | OUTPATIENT
Start: 2024-10-01 | End: 2025-10-01

## 2024-10-01 ASSESSMENT — ENCOUNTER SYMPTOMS
OCCASIONAL FEELINGS OF UNSTEADINESS: 0
DEPRESSION: 0
LOSS OF SENSATION IN FEET: 0

## 2024-10-01 ASSESSMENT — PAIN SCALES - GENERAL: PAINLEVEL: 0-NO PAIN

## 2024-10-01 NOTE — PROGRESS NOTES
"   [facility] [place] [facility]   Sherron Correa is a 84 y.o. year old female presenting for neurologic evaluation - amaurosis fugax.   Referred by: Wai Ortiz MD MPH  PCP: [facility] PROVIDER    8/25/2024: presented to ED with painless vision loss in her R eye lasting 10 minutes and resolved. Was transferred to [facility] [place] for ophtho eval. Eval was unremarkable by ophtho, stroke eval done in ED as well, CTA unrevealing, started aspirin 81mg and atorvastatin 40mg. No echo or holter done yet as pt had ABCD2 score of 1 just from age.     10/1/2024: presents to stroke clinic for amaurosis fugax episode. No further events. Denies any new weakness / numbness / balance issues or falls.     Review of imaging, echocardiogram, labs and other data:   MRI: n/a   CTH unremarkable   Vessel imaging: CTA head and neck negative   Echo:  n/a   LDL: 97  A1c: 5.9  Echocardiogram holter monitor       Relevant ROS, Problem list, Past Medical/ Surgical/ Family/ Social history- reviewed and pertinent details noted in history.     Objective     Visit Vitals  /76 (BP Location: Right arm, Patient Position: Sitting, BP Cuff Size: Adult)   Pulse 76   Temp 36.3 °C (97.3 °F) (Temporal)   Resp 16   Ht 1.6 m (5' 3\")   Wt 65.8 kg (145 lb)   BMI 25.69 kg/m²   OB Status Postmenopausal   Smoking Status Never   BSA 1.71 m²       MENTAL STATUS:  General appearance: resting in bed, in NAD  Orientation: [place] to self, time, place and condition   Language: Expression, repetition, naming, comprehension intact.   Concentration: Intact  Fund of knowledge: Appropriate    CRANIAL NERVES:  - Fundoscopic exam: Deferred   - II/III: PERRL  - II:  Visual fields intact to confrontation bilaterally   - III, IV, VI: EOMI to pursuit without nystagmus  - V: V1-V3 sensation intact bilaterally  - VII: Face muscles symmetric with smile and eye closure  - VIII: Intact to finger rub  - IX, X: Palate elevated symmetrically bilaterally, no " hoarseness  - XI: 5/5 strength on shoulder shrugging bilaterally  - XII: Tongue midline without atrophy or fasciculation    MOTOR: Tone and bulk normal in all extremities    STRENGTH: R L  Deltoid  5 5  Biceps  5 5  Triceps  5 5    5 5  Finger Abd 5 5  Hip flexion 5 5  Quadriceps 5 5  Hamstrings 5 5  DorsiFlex 5 5  PlantarFlex 5 5    REFLEXES: normal reflexes throughout 2+   No clonus, frontal release signs or other pathologic reflexes present.     COORDINATION: Intact on finger to nose bl, intact on heel to shin bl, CANDACE intact bl  SENSORY: Intact to light touch in BUE and BLE  GAIT: Normal stance, cautious walking but with narrow base.       CT head wo IV contrast    Result Date: 8/25/2024  No evidence of acute cortical infarct or intracranial hemorrhage.   No evidence of intracranial hemorrhage or displaced skull fracture.   MACRO: None.   Signed by: Tatiana Mcqueen 8/25/2024 9:58 AM Dictation workstation:   [id number]   No MRI head results found for the past 12 months  No results found for this or any previous visit (from the past 4464 hour(s)).  No echocardiogram results found for the past 12 months     Lab Results   Component Value Date    CHOL 191 08/25/2024    TRIG 69 08/25/2024    HDL 80.0 08/25/2024    CHHDL 2.4 08/25/2024    LDLF 101 (H) 09/06/2023    VLDL 14 03/22/2024    NHDL 106 03/22/2024     Lab Results   Component Value Date    HGBA1C 5.9 (H) 08/25/2024     Lab Results   Component Value Date    GLUCOSE 87 09/23/2024     09/23/2024    K 4.5 09/23/2024     09/23/2024    CO2 33 (H) 09/23/2024    ANIONGAP 10 09/23/2024    BUN 15 09/23/2024    CREATININE 0.93 09/23/2024    CALCIUM 9.6 09/23/2024    PHOS 3.8 09/28/2023    ALBUMIN 4.1 08/25/2024     Lab Results   Component Value Date    CALCIUM 9.6 09/23/2024    PHOS 3.8 09/28/2023    PROT 7.2 08/25/2024    ALBUMIN 4.1 08/25/2024    AST 25 08/25/2024    ALT 22 08/25/2024    ALKPHOS 78 08/25/2024    BILITOT 0.3 08/25/2024     Lab Results  "  Component Value Date    WBC 6.1 08/25/2024    HGB 14.7 08/25/2024    HCT 44.7 08/25/2024    MCV 95 08/25/2024     08/25/2024     INR   Date Value Ref Range Status   03/06/2022 1.1 0.86 - 1.16 Final     Comment:     INR Therapeutic Range: 2.0-3.5     Lab Results   Component Value Date    TSH 3.30 05/01/2023       Assessment/Plan   1. Amaurosis fugax of right eye    2. Transient cerebral ischemia, unspecified type    3. Hyperlipidemia, unspecified    4. Anxiety        PLAN     Amaurosis fugax of R eye -- had MRI and CTA done, which were unremarkable. To compete the evaluation, would do echocardiogram, holter monitor to rule out cardioembolic source of amaurosis. Pt was started on aspirin 81mg. She does have hx of GERD, monitor for any heartburn sx.   Vascular risk factors: HLD with LDL 97, continue high intensity statin, goal LDL <70.     Goals for STROKE PREVENTION- recommendations to reduce the risk of vascular events and promote brain health include monitoring and management of vascular risk factors and lifestyle choices to goal values.     Cardiovascular disease- Goal is monitoring and management of conditions that increase stroke risk.   Antithrombotic \"blood thinner\" medication- Antithrombic Therapy/Blood Thinners : Recommended to prevent a stroke or other vascular event - continue aspirin 81mg   Blood pressure- Normal BP is <120/80 mmHg.  Blood Pressure : Goal is less than 130/80 mmHg  Cholesterol- Ideal lipid profile is an LDL-cholesterol <70 mg/dl, total cholesterol <200 mg/dl, fasting triglycerides < 150 mg/dl and HDL-cholesterol >55 mg/dl.   Blood sugar- Blood Sugar : Goal is normal fasting sugar between  mg/dl   Healthy physical activity- Goal is a moderate level of exercise at least 30 minutes/day, most days of the week.   Healthy weight- Goal is an ideal weight with a waistline <40\" for men or <35\" for women, and BMI of 18.5-25.   Healthy diet- is rich in vegetables, fruits, whole grains, " legumes and fish, low in salt, and avoids red meats and processed/ refined foods.   Healthy sleep- is restorative, ~7 hours/night, with identification and treatment of obstructive/ central sleep apnea that increases the risk of stroke and heart disease.   Smoking- Goal is to stop smoking any tobacco product and avoid second-hand smoke. For help to quit all forms of tobacco, call 1-590-QUIT-NOW (1-578.524.2646) to speak with a specialist at the [place] [facility].    Alcohol- Goal is moderation; no more than 2 servings for men and 1 serving for non-pregnant women.   Drug use- Goal is avoidance of illicit drugs that can cause blood pressure spikes and damage to blood vessels.   Stroke Warning Signs- know the symptoms of stroke and the importance of calling 911/EMS to access the quickest treatment.     Orders Placed This Encounter   Procedures    Holter or Event Cardiac Monitor    Transthoracic Echo (TTE) Complete       Medical decision making was moderate complexity. The patient was seen and evaluated for 1 acute injury that poses threat to body function. I personally reviewed multiple external notes and multiple test results from multiple sources in the EHR. I independently interpreted the patient's MRI and CTA findings. Ziopatch and echocardiogram was ordered for further assessment.

## 2024-10-01 NOTE — PATIENT INSTRUCTIONS
"Goals for STROKE PREVENTION- recommendations to reduce the risk of vascular events and promote brain health include monitoring and management of vascular risk factors and lifestyle choices to goal values.     Cardiovascular disease- Goal is monitoring and management of conditions that increase stroke risk as below.    Antithrombotic \"blood thinner\" medication- Antithrombic Therapy/Blood Thinners : Recommended to prevent a stroke or other vascular event  Blood pressure-  Blood Pressure : Goal is less than 130/80 mmHg  Cholesterol- Ideal lipid profile is an LDL-cholesterol <70 mg/dl, total cholesterol <200 mg/dl, fasting triglycerides < 150 mg/dl and HDL-cholesterol >55 mg/dl.   Blood sugar- Blood Sugar : Goal is normal fasting sugar between  mg/dl. Goal is fasting sugar  mg/dl, after eating less than 180 mg/dl; HbA1c less than 7%  Healthy physical activity- Goal is a moderate level of exercise at least 30 minutes/day, most days of the week.   Healthy weight- Goal is an ideal weight with a waistline <40\" for men or <35\" for women, and BMI of 18.5-25.   Healthy diet- is rich in vegetables, fruits, whole grains, legumes and fish, low in salt, and avoids red meats and processed/ refined foods.   Healthy sleep- is restorative, ~7 hours/night, with identification and treatment of obstructive/ central sleep apnea that increases the risk of stroke and heart disease.   Smoking- Goal is to stop smoking any tobacco product and avoid second-hand smoke. For help to quit all forms of tobacco, call 0-766-QUIT-NOW (1-756.445.4069) to speak with a specialist at the [place] [facility].    Alcohol- Goal is moderation; no more than 2 servings for men and 1 serving for non-pregnant women.   Drug use- Goal is avoidance of illicit drugs that can cause blood pressure spikes and damage to blood vessels.   Stroke Warning Signs- know the symptoms of stroke and the importance of calling 911/EMS to access the quickest treatment. "       Thank you for choosing the [facility] [place] for your healthcare.    It was a pleasure to see you in clinic today and be able to participate in your care. I hope your questions were fully answered but if there are questions or concerns in the future, please feel to call [phone] or leave Displair messages. Please allow 24-28 hours to return your call. Please be ware of stroke warning symptoms and call 911 with any of acute stroke symptoms.      It was a pleasure meeting you. You were evaluated for 'amaurosis fugax' today.   To complete the evalution I would like to get heart ultrasound (echocardiogram) and a heart monitor. (Holter monitor).      Important Phone Numbers for scheduling tests at [facility]  Cardiovascular Testing- EKG, Ziopatch, event monitors, TCD's, Carotid ultrasound and DVT scans  all locations- [phone]

## 2024-10-02 ENCOUNTER — TREATMENT (OUTPATIENT)
Dept: PHYSICAL THERAPY | Facility: CLINIC | Age: 84
End: 2024-10-02
Payer: MEDICARE

## 2024-10-02 ENCOUNTER — APPOINTMENT (OUTPATIENT)
Dept: PRIMARY CARE | Facility: CLINIC | Age: 84
End: 2024-10-02
Payer: MEDICARE

## 2024-10-02 DIAGNOSIS — N81.89 PELVIC FLOOR WEAKNESS: ICD-10-CM

## 2024-10-02 DIAGNOSIS — R10.2 PELVIC PAIN: ICD-10-CM

## 2024-10-02 PROCEDURE — 97110 THERAPEUTIC EXERCISES: CPT | Mod: GP | Performed by: PHYSICAL THERAPIST

## 2024-10-02 PROCEDURE — 97530 THERAPEUTIC ACTIVITIES: CPT | Mod: GP | Performed by: PHYSICAL THERAPIST

## 2024-10-02 NOTE — PROGRESS NOTES
Physical Therapy Treatment    Patient Name: Sherron Correa  MRN: [id number]  Encounter date:  10/2/2024  Time Calculation  Start Time: 0930  Stop Time: 1025  Time Calculation (min): 55 min     PT Therapeutic Procedures Time Entry  Therapeutic Exercise Time Entry: 45  Therapeutic Activity Time Entry: 10    Visit Number:  3 (including evaluation)  Planned total visits: 10 per POC  Visits Authorized/Insurance Coverage:  NO AUTH, 100% COVERAGE, AETNA, ACTIVE SECONDARY  AETNA 60V PT/OT     Current Problem  Problem List Items Addressed This Visit             ICD-10-CM    Pelvic floor weakness N81.89    Pelvic pain R10.2     Precautions     Osteoporosis    Pain     Heaviness isn't always present - currently 0/10  Subjective  General        Feels like heaviness is getting better;  pelvic itching/burning is much better.  Done using steroid creams and now using A&D ointment.  Feels like she has a lot of exercises.      Objective  Posturally patient with right lower shoulder    Treatment:  Therapeutic Exercise:  with coordinated breathing and proper exhale and kegel contraction  Supine:    PPT x 10   Hip adduction with pillow x 10   Hip abduction green x 10   Marching x 10   SLR x 10    Sitting:    Hip abduction with green x 10   Hip adduction x 10 pillow   Sit to stand x 2   Ball press down x 10   Bicep curls 2# x 10   Standing:    Heel raises x 10   Hip extension x 10   Side stepping 10' x 2   Marching x 10   Therapeutic Activity:    Recommended continued use of A&D ointment for vulva since not using Clobetasol now  Discussed breathing with functional movements in kitchen and housework.    Reviewed ways to reduce symptoms of heaviness     Current HEP:  Access Code: [email or web address]  URL: [email or web address]  Date: 09/29/2024  Prepared by: Marlyn Bah    Exercises  - Supine Posterior Pelvic Tilt  - 1 x daily - 7 x weekly - 1 sets - 10 reps  - Supine Hip adduction isometric with Ball -- Exercise 1   - 1 x daily - 7 x  weekly - 1 sets - 10 reps  - Hooklying Clamshell with Resistance -- Exercise 4   - 1 x daily - 7 x weekly - 1 sets - 10 reps  - Supine March  - 1 x daily - 7 x weekly - 1 sets - 10 reps  - Seated Pelvic Floor Contraction with Isometric Hip Adduction  - 1 x daily - 7 x weekly - 1 sets - 10 reps  - Seated Pelvic Floor Contraction with Hip Abduction and Resistance Loop  - 1 x daily - 7 x weekly - 1 sets - 10 reps  - Sit to Stand with Pelvic Floor Contraction  - 1 x daily - 7 x weekly - 1 sets - 2 reps  - Heel Raises with Counter Support  - 1 x daily - 7 x weekly - 1 sets - 10 reps  - Standing Hip Extension with Counter Support  - 1 x daily - 7 x weekly - 1 sets - 10 reps    OP EDUCATION:     Updated HEP  Assessment:     Pt's response to treatment:  Patient pleased with overall skin improvements.  Feels slipping most in evening after cooking etc.  Patient knowledgeable with ways to reduce heaviness/slipping.   Patient continues to benefit from skilled PT to strengthen and progress towards established physical therapy goals.     Pain end of session: 0/10    Plan:     Continue with current POC/no changes    Assessment of current progress against goals:  Progressing toward functional goals    Goals:  Active       Pelvic Floor Goals       STGs - 5 sessions       Start:  09/16/24    Expected End:  10/31/24       1)  Patient will report 25% less pain/symptoms during ADLs  2)  Patient will perform diaphragmatic breathing properly to relax and contract pelvic floor muscle appropriately  3)  Patient will demonstrate good transverse abdominis activation to stabilize lumbopelvic girdle during ADLs                        LTGs - 10 sessions       Start:  09/16/24    Expected End:  12/15/24       1)  Patient will report nil heaviness pain by end of night after ADLs  2)  Patient will improve pelvic floor contraction to by 1/2-1 MMT  with coordinated exhale for improved support for bladder and rectum.    3)  Patient will improve LE  strength by 1/2 MMT grade to support pelvic floor during ADLs.    4)  Patient will be knowledgeable with regards to skin integrity and ways to improve and promote better vulvar skin health.           Patient Stated Goal 1       Start:  09/16/24    Expected End:  12/15/24       Patient wants to strengthen pelvic floor

## 2024-10-08 ENCOUNTER — OFFICE VISIT (OUTPATIENT)
Dept: PRIMARY CARE | Facility: CLINIC | Age: 84
End: 2024-10-08
Payer: MEDICARE

## 2024-10-08 VITALS
OXYGEN SATURATION: 99 % | WEIGHT: 146 LBS | DIASTOLIC BLOOD PRESSURE: 62 MMHG | BODY MASS INDEX: 25.87 KG/M2 | HEIGHT: 63 IN | SYSTOLIC BLOOD PRESSURE: 104 MMHG | HEART RATE: 70 BPM

## 2024-10-08 DIAGNOSIS — M81.0 AGE-RELATED OSTEOPOROSIS WITHOUT CURRENT PATHOLOGICAL FRACTURE: Chronic | ICD-10-CM

## 2024-10-08 DIAGNOSIS — Z76.89 ENCOUNTER TO ESTABLISH CARE: Primary | ICD-10-CM

## 2024-10-08 DIAGNOSIS — E78.5 DYSLIPIDEMIA: ICD-10-CM

## 2024-10-08 DIAGNOSIS — L90.0 LICHEN SCLEROSUS: ICD-10-CM

## 2024-10-08 PROBLEM — N18.9 CHRONIC RENAL FAILURE: Status: RESOLVED | Noted: 2017-11-20 | Resolved: 2024-10-08

## 2024-10-08 PROCEDURE — 1126F AMNT PAIN NOTED NONE PRSNT: CPT

## 2024-10-08 PROCEDURE — 1159F MED LIST DOCD IN RCRD: CPT

## 2024-10-08 PROCEDURE — 99213 OFFICE O/P EST LOW 20 MIN: CPT

## 2024-10-08 PROCEDURE — 1036F TOBACCO NON-USER: CPT

## 2024-10-08 RX ORDER — CLOBETASOL PROPIONATE 0.5 MG/G
OINTMENT TOPICAL
Qty: 60 G | Refills: 1 | Status: SHIPPED | OUTPATIENT
Start: 2024-10-08

## 2024-10-08 ASSESSMENT — PAIN SCALES - GENERAL: PAINLEVEL: 0-NO PAIN

## 2024-10-08 ASSESSMENT — ENCOUNTER SYMPTOMS
CHILLS: 0
SHORTNESS OF BREATH: 0
FEVER: 0

## 2024-10-08 ASSESSMENT — PATIENT HEALTH QUESTIONNAIRE - PHQ9
2. FEELING DOWN, DEPRESSED OR HOPELESS: NOT AT ALL
SUM OF ALL RESPONSES TO PHQ9 QUESTIONS 1 AND 2: 0
1. LITTLE INTEREST OR PLEASURE IN DOING THINGS: NOT AT ALL

## 2024-10-08 NOTE — PROGRESS NOTES
"Subjective   Patient ID: Sherron Correa is a 84 y.o. female who presents for Establish Care (No concerns /la).    Hx osteoporosis, dyslipidemia, recurrent UTI, lichen sclerosis, anxiety    Other providers: Dr. Kelley (Urologist, lichen sclerosis/reccurent UTI), Dr. Howard (neurologist), Dr. Kaba (ophthalmologist), servando in pelvic floor therapy    HLD/TIA: stable on 40 mg Atorvastatin and baby aspirin Tolerating medication with no side effects. Last lipid 8/2024.     Osteoporosis: patient on Prolia. Tolerating with no side effects. Last DEXA 9/28/2023.     Anxiety: patient recently started on 25 mg Sertraline by neurologist. Has only been on for a week.     Patient scheduled to have echocardiogram and heart monitor after possible TIA in 8/2024 when she had sudden transit loss of vision. Ordered through Dr. Howard.     Needs clobetasol for lichen sclerosis refilled.     Medicare physical last 4/2/2024.       Review of Systems   Constitutional:  Negative for chills and fever.   Respiratory:  Negative for shortness of breath.    Cardiovascular:  Negative for chest pain.       Objective   /62   Pulse 70   Ht 1.6 m (5' 3\")   Wt 66.2 kg (146 lb)   SpO2 99%   BMI 25.86 kg/m²     Physical Exam  Constitutional:       General: She is not in acute distress.     Appearance: Normal appearance.   Cardiovascular:      Rate and Rhythm: Normal rate and regular rhythm.      Heart sounds: No murmur heard.  Pulmonary:      Effort: Pulmonary effort is normal.      Breath sounds: Normal breath sounds. No wheezing, rhonchi or rales.   Skin:     General: Skin is warm and dry.      Findings: No rash.   Neurological:      Mental Status: She is alert.   Psychiatric:         Mood and Affect: Mood and affect normal.         Assessment/Plan   Diagnoses and all orders for this visit:  Encounter to establish care  Lichen sclerosus  -     clobetasol (Temovate) 0.05 % ointment; Applied twice daily for 1 week to the external " vaginal tissues.  Age-related osteoporosis without current pathological fracture  Dyslipidemia  Follow-up 6 months for Medicare Physical

## 2024-10-11 ENCOUNTER — HOSPITAL ENCOUNTER (OUTPATIENT)
Dept: CARDIOLOGY | Facility: CLINIC | Age: 84
Discharge: HOME | End: 2024-10-11
Payer: MEDICARE

## 2024-10-11 DIAGNOSIS — G45.3 AMAUROSIS FUGAX OF RIGHT EYE: ICD-10-CM

## 2024-10-11 DIAGNOSIS — G45.9 TRANSIENT CEREBRAL ISCHEMIA, UNSPECIFIED TYPE: ICD-10-CM

## 2024-10-11 PROCEDURE — 93246 EXT ECG>7D<15D RECORDING: CPT

## 2024-10-11 PROCEDURE — 93306 TTE W/DOPPLER COMPLETE: CPT

## 2024-10-11 PROCEDURE — 2500000004 HC RX 250 GENERAL PHARMACY W/ HCPCS (ALT 636 FOR OP/ED): Performed by: STUDENT IN AN ORGANIZED HEALTH CARE EDUCATION/TRAINING PROGRAM

## 2024-10-14 LAB
AORTIC VALVE PEAK VELOCITY: 1.35 M/S
AV PEAK GRADIENT: 7.3 MMHG
AVA (PEAK VEL): 1.8 CM2
EJECTION FRACTION APICAL 4 CHAMBER: 56.9
EJECTION FRACTION: 58 %
LEFT ATRIUM VOLUME AREA LENGTH INDEX BSA: 32.9 ML/M2
LEFT VENTRICLE INTERNAL DIMENSION DIASTOLE: 4.46 CM (ref 3.5–6)
LEFT VENTRICULAR OUTFLOW TRACT DIAMETER: 1.88 CM
MITRAL VALVE E/A RATIO: 0.87
RIGHT VENTRICLE FREE WALL PEAK S': 11.37 CM/S
RIGHT VENTRICLE PEAK SYSTOLIC PRESSURE: 25.7 MMHG
TRICUSPID ANNULAR PLANE SYSTOLIC EXCURSION: 2.5 CM

## 2024-10-16 ENCOUNTER — TELEPHONE (OUTPATIENT)
Dept: NEUROLOGY | Facility: CLINIC | Age: 84
End: 2024-10-16
Payer: MEDICARE

## 2024-10-16 NOTE — TELEPHONE ENCOUNTER
----- Message from Leyla Howard sent at 10/16/2024  9:44 AM EDT -----  This echocardiogram is largely unremarkable. It did not show any potential source of stroke / transient vision loss. We should wait for the holter (heart monitor) results looking for any irregular rhythm.     Would you mind letting pt know?  Thank you so much!

## 2024-10-18 ENCOUNTER — TREATMENT (OUTPATIENT)
Dept: PHYSICAL THERAPY | Facility: CLINIC | Age: 84
End: 2024-10-18
Payer: MEDICARE

## 2024-10-18 DIAGNOSIS — N81.89 PELVIC FLOOR WEAKNESS: ICD-10-CM

## 2024-10-18 DIAGNOSIS — R10.2 PELVIC PAIN: ICD-10-CM

## 2024-10-18 PROCEDURE — 97110 THERAPEUTIC EXERCISES: CPT | Mod: GP | Performed by: PHYSICAL THERAPIST

## 2024-10-18 NOTE — PROGRESS NOTES
Physical Therapy Treatment    Patient Name: Sherron Correa  MRN: [id number]  Encounter date:  10/18/2024  Time Calculation  Start Time: 1230  Stop Time: 1315  Time Calculation (min): 45 min     PT Therapeutic Procedures Time Entry  Therapeutic Exercise Time Entry: 45    Visit Number:  4 (including evaluation)  Planned total visits: 10 per POC  Visits Authorized/Insurance Coverage:  NO AUTH, 100% COVERAGE, AETNA, ACTIVE SECONDARY  AETNA 60V PT/OT     Current Problem  Problem List Items Addressed This Visit             ICD-10-CM    Pelvic floor weakness N81.89    Pelvic pain R10.2     Precautions     Osteoporosis    Pain     Heaviness isn't always present - currently 0/10    Subjective  General        Feels like heaviness is getting better;  pelvic itching/burning is much better.  Done using steroid creams and now using A&D ointment.  Feels like she has a lot of exercises.    Notices irritation only when wipes for urination;      Objective  Reduced hip extension when ambulating    Treatment:  Therapeutic Exercise:  with coordinated breathing and proper exhale and kegel contraction  Supine:    PPT x 10   Hip adduction with pillow x 10   Hip abduction green x 10   Marching x 10   SLR x 10    Sitting:    Hip abduction with green x 10   Hip adduction x 10 pillow   Sit to stand x 2   Ball press down x 10   Bicep curls 2# x 10   Standing:    Heel raises x 10   Hip extension x 10   Side stepping 10' x 2   Marching x 10     Current HEP:  Access Code: [email or web address]  URL: [email or web address]  Date: 09/29/2024  Prepared by: Marlyn Bah    Exercises  - Supine Posterior Pelvic Tilt  - 1 x daily - 7 x weekly - 1 sets - 10 reps  - Supine Hip adduction isometric with Ball -- Exercise 1   - 1 x daily - 7 x weekly - 1 sets - 10 reps  - Hooklying Clamshell with Resistance -- Exercise 4   - 1 x daily - 7 x weekly - 1 sets - 10 reps  - Supine March  - 1 x daily - 7 x weekly - 1 sets - 10 reps  - Seated Pelvic Floor Contraction  with Isometric Hip Adduction  - 1 x daily - 7 x weekly - 1 sets - 10 reps  - Seated Pelvic Floor Contraction with Hip Abduction and Resistance Loop  - 1 x daily - 7 x weekly - 1 sets - 10 reps  - Sit to Stand with Pelvic Floor Contraction  - 1 x daily - 7 x weekly - 1 sets - 2 reps  - Heel Raises with Counter Support  - 1 x daily - 7 x weekly - 1 sets - 10 reps  - Standing Hip Extension with Counter Support  - 1 x daily - 7 x weekly - 1 sets - 10 reps    OP EDUCATION:     Updated HEP  Assessment:     Pt's response to treatment:  Patient improving in sensation of heaviness.  Compliant with HEP.  Consider reducing sessions if patient continues to improve symptoms.      Pain end of session: 0/10    Plan:     Continue with current POC/no changes -- to be seen in 2 weeks;      Assessment of current progress against goals:  Progressing toward functional goals    Goals:  Active       Pelvic Floor Goals       STGs - 5 sessions       Start:  09/16/24    Expected End:  10/31/24       1)  Patient will report 25% less pain/symptoms during ADLs  2)  Patient will perform diaphragmatic breathing properly to relax and contract pelvic floor muscle appropriately  3)  Patient will demonstrate good transverse abdominis activation to stabilize lumbopelvic girdle during ADLs                        LTGs - 10 sessions       Start:  09/16/24    Expected End:  12/15/24       1)  Patient will report nil heaviness pain by end of night after ADLs  2)  Patient will improve pelvic floor contraction to by 1/2-1 MMT  with coordinated exhale for improved support for bladder and rectum.    3)  Patient will improve LE strength by 1/2 MMT grade to support pelvic floor during ADLs.    4)  Patient will be knowledgeable with regards to skin integrity and ways to improve and promote better vulvar skin health.           Patient Stated Goal 1       Start:  09/16/24    Expected End:  12/15/24       Patient wants to strengthen pelvic floor

## 2024-10-22 ENCOUNTER — LAB (OUTPATIENT)
Dept: LAB | Facility: LAB | Age: 84
End: 2024-10-22
Payer: MEDICARE

## 2024-10-22 ENCOUNTER — APPOINTMENT (OUTPATIENT)
Dept: PHYSICAL THERAPY | Facility: CLINIC | Age: 84
End: 2024-10-22
Payer: MEDICARE

## 2024-10-22 DIAGNOSIS — N39.0 CHRONIC UTI: ICD-10-CM

## 2024-10-22 PROCEDURE — 81003 URINALYSIS AUTO W/O SCOPE: CPT

## 2024-10-23 LAB
APPEARANCE UR: ABNORMAL
BILIRUB UR STRIP.AUTO-MCNC: NEGATIVE MG/DL
COLOR UR: ABNORMAL
GLUCOSE UR STRIP.AUTO-MCNC: NORMAL MG/DL
HOLD SPECIMEN: NORMAL
KETONES UR STRIP.AUTO-MCNC: NEGATIVE MG/DL
LEUKOCYTE ESTERASE UR QL STRIP.AUTO: NEGATIVE
NITRITE UR QL STRIP.AUTO: NEGATIVE
PH UR STRIP.AUTO: 7 [PH]
PROT UR STRIP.AUTO-MCNC: NEGATIVE MG/DL
RBC # UR STRIP.AUTO: NEGATIVE /UL
SP GR UR STRIP.AUTO: 1.01
UROBILINOGEN UR STRIP.AUTO-MCNC: NORMAL MG/DL

## 2024-10-25 DIAGNOSIS — N39.0 CHRONIC UTI: ICD-10-CM

## 2024-10-29 ENCOUNTER — TREATMENT (OUTPATIENT)
Dept: PHYSICAL THERAPY | Facility: CLINIC | Age: 84
End: 2024-10-29
Payer: MEDICARE

## 2024-10-29 DIAGNOSIS — R10.2 PELVIC PAIN: ICD-10-CM

## 2024-10-29 DIAGNOSIS — N81.89 PELVIC FLOOR WEAKNESS: ICD-10-CM

## 2024-10-29 PROCEDURE — 97110 THERAPEUTIC EXERCISES: CPT | Mod: GP | Performed by: PHYSICAL THERAPIST

## 2024-11-05 ENCOUNTER — APPOINTMENT (OUTPATIENT)
Dept: PHYSICAL THERAPY | Facility: CLINIC | Age: 84
End: 2024-11-05
Payer: MEDICARE

## 2024-11-12 ENCOUNTER — APPOINTMENT (OUTPATIENT)
Dept: PHYSICAL THERAPY | Facility: CLINIC | Age: 84
End: 2024-11-12
Payer: MEDICARE

## 2024-11-18 ENCOUNTER — OFFICE VISIT (OUTPATIENT)
Dept: NEUROLOGY | Facility: CLINIC | Age: 84
End: 2024-11-18
Payer: MEDICARE

## 2024-11-18 VITALS
SYSTOLIC BLOOD PRESSURE: 116 MMHG | WEIGHT: 145 LBS | HEIGHT: 64 IN | BODY MASS INDEX: 24.75 KG/M2 | RESPIRATION RATE: 19 BRPM | HEART RATE: 75 BPM | TEMPERATURE: 97.7 F | DIASTOLIC BLOOD PRESSURE: 64 MMHG

## 2024-11-18 DIAGNOSIS — F41.9 ANXIETY: ICD-10-CM

## 2024-11-18 DIAGNOSIS — G45.3 AMAUROSIS FUGAX OF RIGHT EYE: Primary | ICD-10-CM

## 2024-11-18 PROCEDURE — 99214 OFFICE O/P EST MOD 30 MIN: CPT | Performed by: STUDENT IN AN ORGANIZED HEALTH CARE EDUCATION/TRAINING PROGRAM

## 2024-11-18 PROCEDURE — 1126F AMNT PAIN NOTED NONE PRSNT: CPT | Performed by: STUDENT IN AN ORGANIZED HEALTH CARE EDUCATION/TRAINING PROGRAM

## 2024-11-18 PROCEDURE — 1036F TOBACCO NON-USER: CPT | Performed by: STUDENT IN AN ORGANIZED HEALTH CARE EDUCATION/TRAINING PROGRAM

## 2024-11-18 PROCEDURE — 1159F MED LIST DOCD IN RCRD: CPT | Performed by: STUDENT IN AN ORGANIZED HEALTH CARE EDUCATION/TRAINING PROGRAM

## 2024-11-18 RX ORDER — SERTRALINE HYDROCHLORIDE 50 MG/1
50 TABLET, FILM COATED ORAL DAILY
Qty: 30 TABLET | Refills: 11 | Status: SHIPPED | OUTPATIENT
Start: 2024-11-18 | End: 2025-11-18

## 2024-11-18 ASSESSMENT — ENCOUNTER SYMPTOMS
DEPRESSION: 0
OCCASIONAL FEELINGS OF UNSTEADINESS: 0
LOSS OF SENSATION IN FEET: 0

## 2024-11-18 ASSESSMENT — PAIN SCALES - GENERAL: PAINLEVEL_OUTOF10: 0-NO PAIN

## 2024-11-18 NOTE — PATIENT INSTRUCTIONS
"It was a pleasure meeting you today. You were evaluated for amaurosis fugax.    Holter monitor and echocardiogram (ultrasound of the heart) results were fairly good.    Will continue aspirin 81mg.   Cholesterol level was 97 -- will repeat lipid panel.         Goals for STROKE PREVENTION- recommendations to reduce the risk of vascular events and promote brain health include monitoring and management of vascular risk factors and lifestyle choices to goal values.       Cardiovascular disease- Goal is monitoring and management of conditions that increase stroke risk as below.    Antithrombotic \"blood thinner\" medication-   Blood pressure-    Cholesterol- Ideal lipid profile is an LDL-cholesterol <70 mg/dl, total cholesterol <200 mg/dl, fasting triglycerides < 150 mg/dl and HDL-cholesterol >55 mg/dl.   Blood sugar- Blood Sugar : Goal is normal fasting sugar between  mg/dl. Goal is fasting sugar  mg/dl, after eating less than 180 mg/dl; HbA1c less than 7%  Healthy physical activity- Goal is a moderate level of exercise at least 30 minutes/day, most days of the week.   Healthy weight- Goal is an ideal weight with a waistline <40\" for men or <35\" for women, and BMI of 18.5-25.   Healthy diet- is rich in vegetables, fruits, whole grains, legumes and fish, low in salt, and avoids red meats and processed/ refined foods.   Healthy sleep- is restorative, ~7 hours/night, with identification and treatment of obstructive/ central sleep apnea that increases the risk of stroke and heart disease.    Alcohol- Goal is moderation; no more than 2 servings for men and 1 serving for non-pregnant women.   Stroke Warning Signs- know the symptoms of stroke and the importance of calling 911/EMS to access the quickest treatment.         Thank you for choosing the [facility] for your healthcare.    It was a pleasure to see you in clinic today and be able to participate in your care. I hope your " questions were fully answered but if there are questions or concerns in the future, please feel to call [phone] or leave [employer] messages for the care team. Please allow 24-28 hours to return your call. Please be ware of stroke warning symptoms and call 911 with any of acute stroke symptoms.

## 2024-11-18 NOTE — PROGRESS NOTES
"   [facility]   Sherron Correa is a 84 y.o. year old female presenting for neurologic evaluation - amaurosis fugax.   Referred by: No ref. provider found  PCP: Kaylee Green PA-C    8/25/2024: presented to ED with painless vision loss in her R eye lasting 10 minutes and resolved. [place] [facility] [place]. Eval was unremarkable by ophtho, stroke eval done in ED as well, CTA unrevealing, started aspirin 81mg and atorvastatin 40mg. No echo or holter done yet as pt had ABCD2 score of 1 just from age.     10/1/2024: presents to stroke clinic for amaurosis fugax episode. No further events. Denies any new weakness / numbness / balance issues or falls.     11/18/2024: follow up. Holter was negative for afib/flutter. Echo EF normal, LA size normal, bubble negative. No new events.     Review of imaging, echocardiogram, labs and other data:   MRI: n/a   CTH unremarkable   Vessel imaging: CTA head and neck negative   Echo: EF 55-60%, LA upper limit of normal in size, bubble study neg   LDL: 97 on 8/25/2024   A1c: 5.9  Hoter: no afib/flutter       Relevant ROS, Problem list, Past Medical/ Surgical/ Family/ Social history- reviewed and pertinent details noted in history.     Objective     Visit Vitals  /64 (BP Location: Left arm, Patient Position: Sitting, BP Cuff Size: Adult)   Pulse 75   Temp 36.5 °C (97.7 °F) (Temporal)   Resp 19   Ht 1.613 m (5' 3.5\")   Wt 65.8 kg (145 lb)   BMI 25.28 kg/m²   OB Status Postmenopausal   Smoking Status Never   BSA 1.72 m²       MENTAL STATUS:  General appearance: resting in bed, in NAD  Orientation: [place] to self, time, place and condition   Language: Expression, repetition, naming, comprehension intact.   Concentration: Intact  Fund of knowledge: Appropriate    CRANIAL NERVES:  - Fundoscopic exam: Deferred   - II/III: PERRL  - II:  Visual fields intact to confrontation bilaterally   - III, IV, VI: EOMI to pursuit without " nystagmus  - V: V1-V3 sensation intact bilaterally  - VII: Face muscles symmetric with smile and eye closure  - VIII: Intact to finger rub  - IX, X: Palate elevated symmetrically bilaterally, no hoarseness  - XI: 5/5 strength on shoulder shrugging bilaterally  - XII: Tongue midline without atrophy or fasciculation    MOTOR: Tone and bulk normal in all extremities    STRENGTH: 5/5 throughout proximally and distally   REFLEXES: normal symmetric reflexes throughout 2+   No clonus    COORDINATION: Intact on finger to nose bl, intact on heel to shin bl, CANDACE intact bl  SENSORY: Intact to light touch in BUE and BLE  GAIT: Normal stance, cautious walking but with narrow base.       CT head wo IV contrast    Result Date: 8/25/2024  No evidence of acute cortical infarct or intracranial hemorrhage.   No evidence of intracranial hemorrhage or displaced skull fracture.   MACRO: None.   Signed by: Tatiana Mcqueen 8/25/2024 9:58 AM Dictation workstation:   [id number]   No MRI head results found for the past 12 months  No results found. However, due to the size of the patient record, not all encounters were searched. Please check Results Review for a complete set of results.  No echocardiogram results found for the past 12 months     Lab Results   Component Value Date    CHOL 191 08/25/2024    TRIG 69 08/25/2024    HDL 80.0 08/25/2024    CHHDL 2.4 08/25/2024    LDLF 101 (H) 09/06/2023    VLDL 14 03/22/2024    NHDL 106 03/22/2024     Lab Results   Component Value Date    HGBA1C 5.9 (H) 08/25/2024     Lab Results   Component Value Date    GLUCOSE 87 09/23/2024     09/23/2024    K 4.5 09/23/2024     09/23/2024    CO2 33 (H) 09/23/2024    ANIONGAP 10 09/23/2024    BUN 15 09/23/2024    CREATININE 0.93 09/23/2024    CALCIUM 9.6 09/23/2024    PHOS 3.8 09/28/2023    ALBUMIN 4.1 08/25/2024     Lab Results   Component Value Date    CALCIUM 9.6 09/23/2024    PHOS 3.8 09/28/2023    PROT 7.2 08/25/2024    ALBUMIN 4.1 08/25/2024     "AST 25 08/25/2024    ALT 22 08/25/2024    ALKPHOS 78 08/25/2024    BILITOT 0.3 08/25/2024     Lab Results   Component Value Date    WBC 6.1 08/25/2024    HGB 14.7 08/25/2024    HCT 44.7 08/25/2024    MCV 95 08/25/2024     08/25/2024     INR   Date Value Ref Range Status   03/06/2022 1.1 0.86 - 1.16 Final     Comment:     INR Therapeutic Range: 2.0-3.5     Lab Results   Component Value Date    TSH 3.30 05/01/2023       Assessment/Plan   1. Amaurosis fugax of right eye    2. Anxiety          PLAN     Amaurosis fugax of R eye -- had MRI and CTA done, which were unremarkable. To compete the evaluation, would do echocardiogram, holter monitor to rule out cardioembolic source of amaurosis. Pt was started on aspirin 81mg. She does have hx of GERD, monitor for any heartburn sx.   Vascular risk factors: HLD with LDL 97, continue high intensity statin, goal LDL <70, repeat lipid panel to see if at goal.  Anxiety after amaurosis --  no side effects on sertraline, but also no significant difference in anxiety either. Will increase it to 50mg.     Goals for STROKE PREVENTION- recommendations to reduce the risk of vascular events and promote brain health include monitoring and management of vascular risk factors and lifestyle choices to goal values.     Cardiovascular disease- Goal is monitoring and management of conditions that increase stroke risk.   Antithrombotic \"blood thinner\" medication- Antithrombic Therapy/Blood Thinners : Recommended to prevent a stroke or other vascular event - continue aspirin 81mg   Blood pressure- Normal BP is <120/80 mmHg.  Blood Pressure : Goal is less than 130/80 mmHg  Cholesterol- Ideal lipid profile is an LDL-cholesterol <70 mg/dl, total cholesterol <200 mg/dl, fasting triglycerides < 150 mg/dl and HDL-cholesterol >55 mg/dl.   Blood sugar- Blood Sugar : Goal is normal fasting sugar between  mg/dl   Healthy physical activity- Goal is a moderate level of exercise at least 30 " "minutes/day, most days of the week.   Healthy weight- Goal is an ideal weight with a waistline <40\" for men or <35\" for women, and BMI of 18.5-25.   Healthy diet- is rich in vegetables, fruits, whole grains, legumes and fish, low in salt, and avoids red meats and processed/ refined foods.   Healthy sleep- is restorative, ~7 hours/night, with identification and treatment of obstructive/ central sleep apnea that increases the risk of stroke and heart disease.   Smoking- Goal is to stop smoking any tobacco product and avoid second-hand smoke. For help to quit all forms of tobacco, call 5-346-QUIT-NOW (1-983.840.4208) to speak with a specialist at the [place] [facility].    Alcohol- Goal is moderation; no more than 2 servings for men and 1 serving for non-pregnant women.   Drug use- Goal is avoidance of illicit drugs that can cause blood pressure spikes and damage to blood vessels.   Stroke Warning Signs- know the symptoms of stroke and the importance of calling 911/EMS to access the quickest treatment.     Orders Placed This Encounter   Procedures    Lipid Panel       The total appointment time today was 31 minutes. This time included personally preparing to see the patient, obtaining the history, performing a medically necessary appropriate physical examination, counseling and educating the patient/family, independently interpreting results to the patient/family and documenting clinical information in the medical record.          "

## 2024-11-19 ENCOUNTER — APPOINTMENT (OUTPATIENT)
Dept: PHYSICAL THERAPY | Facility: CLINIC | Age: 84
End: 2024-11-19
Payer: MEDICARE

## 2024-12-06 ENCOUNTER — DOCUMENTATION (OUTPATIENT)
Dept: PHYSICAL THERAPY | Facility: CLINIC | Age: 84
End: 2024-12-06
Payer: MEDICARE

## 2024-12-06 NOTE — PROGRESS NOTES
Physical Therapy    Discharge Summary    Name: Sherron Correa  MRN: [id number]  : 1940  Date: 24    Discharge Summary: PT    Discharge Information: Date of discharge 24, Date of last visit 10/29/24, Date of evaluation 24, and Number of attended visits 5    Therapy Summary: Heaviness much better.  Emptying bladder better.  Getting up 1-2 times at night.  Most of the time 0 heaviness.  Good performance of HEP    Discharge Status: achieved STGs, Progressing toward Long term goals.     Rehab Discharge Reason: Achieved all and/or the most significant goals(s)

## 2024-12-19 ENCOUNTER — LAB (OUTPATIENT)
Dept: LAB | Facility: LAB | Age: 84
End: 2024-12-19
Payer: MEDICARE

## 2024-12-19 ENCOUNTER — TELEPHONE (OUTPATIENT)
Dept: NEUROLOGY | Facility: CLINIC | Age: 84
End: 2024-12-19

## 2024-12-19 DIAGNOSIS — G45.3 AMAUROSIS FUGAX OF RIGHT EYE: ICD-10-CM

## 2024-12-19 LAB
CHOLEST SERPL-MCNC: 173 MG/DL (ref 0–199)
CHOLEST/HDLC SERPL: 2.4 {RATIO}
HDLC SERPL-MCNC: 72.3 MG/DL
LDLC SERPL CALC-MCNC: 85 MG/DL
NON HDL CHOLESTEROL: 101 MG/DL (ref 0–149)
TRIGL SERPL-MCNC: 77 MG/DL (ref 0–149)
VLDL: 15 MG/DL (ref 0–40)

## 2024-12-19 PROCEDURE — 80061 LIPID PANEL: CPT

## 2024-12-19 PROCEDURE — 36415 COLL VENOUS BLD VENIPUNCTURE: CPT

## 2024-12-19 NOTE — TELEPHONE ENCOUNTER
----- Message from Leyla Howard sent at 12/19/2024  1:09 PM EST -----  The repeat cholesterol level -- LDL -- is 85 still, which is slightly above our goal of <70. If patient is agreeable, I would recommend increasing current atorvastatin dose to 80mg as long as there has not been any side effects.    Would you mind letting pt know? Thank you!

## 2024-12-20 ENCOUNTER — TELEPHONE (OUTPATIENT)
Dept: NEUROLOGY | Facility: CLINIC | Age: 84
End: 2024-12-20
Payer: MEDICARE

## 2024-12-23 DIAGNOSIS — E78.5 HYPERLIPIDEMIA, UNSPECIFIED: ICD-10-CM

## 2024-12-23 RX ORDER — ATORVASTATIN CALCIUM 40 MG/1
80 TABLET, FILM COATED ORAL DAILY
Qty: 180 TABLET | Refills: 3 | Status: SHIPPED | OUTPATIENT
Start: 2024-12-23 | End: 2024-12-26

## 2024-12-26 DIAGNOSIS — E78.5 HYPERLIPIDEMIA, UNSPECIFIED: ICD-10-CM

## 2024-12-26 RX ORDER — ATORVASTATIN CALCIUM 80 MG/1
80 TABLET, FILM COATED ORAL DAILY
Qty: 90 TABLET | Refills: 3 | Status: SHIPPED | OUTPATIENT
Start: 2024-12-26 | End: 2025-12-26

## 2025-03-24 ENCOUNTER — TELEPHONE (OUTPATIENT)
Dept: PRIMARY CARE | Facility: CLINIC | Age: 85
End: 2025-03-24
Payer: MEDICARE

## 2025-03-24 ENCOUNTER — TELEPHONE (OUTPATIENT)
Dept: INFUSION THERAPY | Facility: CLINIC | Age: 85
End: 2025-03-24
Payer: MEDICARE

## 2025-03-24 DIAGNOSIS — M81.0 AGE-RELATED OSTEOPOROSIS WITHOUT CURRENT PATHOLOGICAL FRACTURE: ICD-10-CM

## 2025-03-24 DIAGNOSIS — M81.0 AGE-RELATED OSTEOPOROSIS WITHOUT CURRENT PATHOLOGICAL FRACTURE: Primary | ICD-10-CM

## 2025-03-24 RX ORDER — DIPHENHYDRAMINE HYDROCHLORIDE 50 MG/ML
50 INJECTION, SOLUTION INTRAMUSCULAR; INTRAVENOUS AS NEEDED
OUTPATIENT
Start: 2025-03-24

## 2025-03-24 RX ORDER — EPINEPHRINE 0.3 MG/.3ML
0.3 INJECTION SUBCUTANEOUS EVERY 5 MIN PRN
OUTPATIENT
Start: 2025-03-24

## 2025-03-24 RX ORDER — FAMOTIDINE 10 MG/ML
20 INJECTION, SOLUTION INTRAVENOUS ONCE AS NEEDED
OUTPATIENT
Start: 2025-03-24

## 2025-03-24 RX ORDER — ALBUTEROL SULFATE 0.83 MG/ML
3 SOLUTION RESPIRATORY (INHALATION) AS NEEDED
OUTPATIENT
Start: 2025-03-24

## 2025-03-27 ENCOUNTER — LAB (OUTPATIENT)
Dept: LAB | Facility: HOSPITAL | Age: 85
End: 2025-03-27
Payer: MEDICARE

## 2025-03-27 LAB
ALBUMIN SERPL BCP-MCNC: 3.8 G/DL (ref 3.4–5)
ALP SERPL-CCNC: 71 U/L (ref 33–136)
ALT SERPL W P-5'-P-CCNC: 20 U/L (ref 7–45)
ANION GAP SERPL CALC-SCNC: 11 MMOL/L (ref 10–20)
AST SERPL W P-5'-P-CCNC: 21 U/L (ref 9–39)
BILIRUB SERPL-MCNC: 0.5 MG/DL (ref 0–1.2)
BUN SERPL-MCNC: 17 MG/DL (ref 6–23)
CALCIUM SERPL-MCNC: 8.9 MG/DL (ref 8.6–10.3)
CHLORIDE SERPL-SCNC: 100 MMOL/L (ref 98–107)
CO2 SERPL-SCNC: 28 MMOL/L (ref 21–32)
CREAT SERPL-MCNC: 0.81 MG/DL (ref 0.5–1.05)
EGFRCR SERPLBLD CKD-EPI 2021: 72 ML/MIN/1.73M*2
GLUCOSE SERPL-MCNC: 112 MG/DL (ref 74–99)
POTASSIUM SERPL-SCNC: 4.3 MMOL/L (ref 3.5–5.3)
PROT SERPL-MCNC: 5.8 G/DL (ref 6.4–8.2)
SODIUM SERPL-SCNC: 135 MMOL/L (ref 136–145)

## 2025-03-27 PROCEDURE — 80053 COMPREHEN METABOLIC PANEL: CPT

## 2025-03-27 NOTE — TELEPHONE ENCOUNTER
Sodium just slightly low, should start lightly salting food. Also protein levels low, increase dietary protein.

## 2025-03-31 ENCOUNTER — APPOINTMENT (OUTPATIENT)
Dept: INFUSION THERAPY | Facility: CLINIC | Age: 85
End: 2025-03-31
Payer: MEDICARE

## 2025-03-31 ENCOUNTER — INFUSION (OUTPATIENT)
Dept: INFUSION THERAPY | Facility: CLINIC | Age: 85
End: 2025-03-31
Payer: MEDICARE

## 2025-03-31 VITALS
HEART RATE: 78 BPM | OXYGEN SATURATION: 95 % | WEIGHT: 143.2 LBS | RESPIRATION RATE: 16 BRPM | TEMPERATURE: 98.1 F | SYSTOLIC BLOOD PRESSURE: 116 MMHG | DIASTOLIC BLOOD PRESSURE: 71 MMHG | BODY MASS INDEX: 24.97 KG/M2

## 2025-03-31 DIAGNOSIS — M81.0 AGE-RELATED OSTEOPOROSIS WITHOUT CURRENT PATHOLOGICAL FRACTURE: ICD-10-CM

## 2025-03-31 PROCEDURE — 96372 THER/PROPH/DIAG INJ SC/IM: CPT | Performed by: REGISTERED NURSE

## 2025-03-31 RX ORDER — EPINEPHRINE 0.3 MG/.3ML
0.3 INJECTION SUBCUTANEOUS EVERY 5 MIN PRN
OUTPATIENT
Start: 2025-09-21

## 2025-03-31 RX ORDER — ALBUTEROL SULFATE 0.83 MG/ML
3 SOLUTION RESPIRATORY (INHALATION) AS NEEDED
OUTPATIENT
Start: 2025-09-21

## 2025-03-31 RX ORDER — DIPHENHYDRAMINE HYDROCHLORIDE 50 MG/ML
50 INJECTION, SOLUTION INTRAMUSCULAR; INTRAVENOUS AS NEEDED
OUTPATIENT
Start: 2025-09-21

## 2025-03-31 RX ORDER — FAMOTIDINE 10 MG/ML
20 INJECTION, SOLUTION INTRAVENOUS ONCE AS NEEDED
OUTPATIENT
Start: 2025-09-21

## 2025-03-31 ASSESSMENT — ENCOUNTER SYMPTOMS
EXTREMITY WEAKNESS: 0
DIZZINESS: 0
WHEEZING: 0
NUMBNESS: 0
LIGHT-HEADEDNESS: 0
ROS SKIN COMMENTS: VAGINAL
WOUND: 0
SHORTNESS OF BREATH: 0
LEG SWELLING: 0
COUGH: 0
PALPITATIONS: 0

## 2025-03-31 NOTE — PATIENT INSTRUCTIONS
Today :We administered denosumab. 6 month Prolia 60mg subcutaneous injection in the right upper arm.  Blood calcium level to be drawn within 28 days of next Prolia appointment.    For:   1. Age-related osteoporosis without current pathological fracture       Your next appointment is due in:  6 months        Please read the  Medication Guide that was given to you and reviewed during todays visit.     (Tell all doctors including dentists that you are taking this medication)     Go to the emergency room or call 911 if:  -You have signs of allergic reaction:   -Rash, hives, itching.   -Swollen, blistered, peeling skin.   -Swelling of face, lips, mouth, tongue or throat.   -Tightness of chest, trouble breathing, swallowing or talking     Call your doctor:  - If injection site gets red, warm, swollen, itchy or leaks fluid or pus.     (Leave band aid on your injection site for at least 2 hours and keep area clean and dry  - If you get sick or have symptoms of infection or are not feeling well for any reason.    (Wash your hands often, stay away from people who are sick)  - If you have side effects from your medication that do not go away or are bothersome.     (Refer to the teaching your nurse gave you for side effects to call your doctor about)    - Common side effects may include:  stuffy nose, headache, feeling tired, muscle aches, upset stomach  - Before receiving any vaccines     - Call the [facility] at   If:  - You get sick, are on antibiotics, have had a recent vaccine, have surgery or dental work and your doctor wants your visit rescheduled.  - You need to cancel and reschedule your visit for any reason. Call at least 2 days before your visit if you need to cancel.   - Your insurance changes before your next visit.    (We will need to get approval from your new insurance. This can take up to two weeks.)     The [facility] is opened Monday thru Friday. We are closed on weekends and  holidays.   Voice mail will take your call if the center is closed. If you leave a message please allow 24 hours for a call back during weekdays. If you leave a message on a weekend/holiday, we will call you back the next business day.    A pharmacist is available Monday - Friday from 8:30AM to 3:30PM to help answer any questions you may have about your prescriptions(s). Please call pharmacy at:    [facility]: [phone]  [facility]: [phone]  [facility]: [phone]

## 2025-03-31 NOTE — PROGRESS NOTES
[facility]   Infusion Clinic Note   Date: 2025   Name: Sherron Correa  : 1940   MRN: [id number]         Reason for Visit: Follow-up and Injections (6 month Prolia 60mg subcutaneous/injection)         Today: We administered denosumab.       Ordered By: Autumn Weston MD       For a Diagnosis of: Age-related osteoporosis without current pathological fracture       At today's visit patient accompanied by: Self      Today's Vitals:   Vitals:    25 0832   BP: 116/71   Pulse: 78   Resp: 16   Temp: 36.7 °C (98.1 °F)   TempSrc: Temporal   SpO2: 95%   Weight: 65 kg (143 lb 3.2 oz)             Pre - Treatment Checklist:      - Previous reaction to current treatment: no      (Assess patient for the concerns below. Document provider notification as appropriate).  - Active or recent infection with/without current antibiotic use: yes - Preventative (Omnicef) for UTI's  - Recent or planned invasive dental work: no  - Recent or planned surgeries: no  - Recently received or plans to receive vaccinations: no  - Has treatment related toxicities: no  - Any chance may be pregnant:  no      Pain: 0   - Is the pain different from normal: no   - Is prescribing Doctor aware:  n/a      Labs: Labs drawn and sent per order      Fall Risk Screening: Ramos Fall Risk  History of Falling, Immediate or Within 3 Months: No  Secondary Diagnosis: No  Ambulatory Aid: Walks without aid/bedrest/nurse assist  Intravenous Therapy/Heparin Lock: No  Gait/Transferring: Normal/bedrest/immobile  Mental Status: Oriented to own ability  Ramos Fall Risk Score: 0       Review Of Systems:  Review of Systems   Respiratory:  Negative for cough, shortness of breath and wheezing.    Cardiovascular:  Negative for chest pain, leg swelling and palpitations.   Skin:  Positive for itching. Negative for rash and wound.        Vaginal   Neurological:  Negative for dizziness, extremity weakness, light-headedness and numbness.          Infusion Readiness:  - Assessment Concerns Related to Infusion: No  - Provider notified: no      New Patient Education:    N/A (returning patient for continuation of therapy. Ongoing education provided as needed.)        Treatment Conditions & Drug Specific Questions:    Denosumab  (PROLIA. XGEVA)    (Unless otherwise specified on patient specific therapy plan):     TREATMENT CONDITIONS:  Unless otherwise specified on patient specific therapy plan HOLD and notify provider prior to proceeding with today's injection if patients:  O Corrected or Serum Calcium LESS THAN 8.6 mg/dL  OR Ionized calcium less than 1.1 mmol/L or  less than 4.7 mg/dL (depending on resulting agency)  o Recent or planned invasive dental procedure (within 4 weeks)    Lab Results   Component Value Date    CALCIUM 8.9 03/27/2025    PHOS 3.8 09/28/2023      Lab Results   Component Value Date    CAION 1.25 09/23/2024       Patient meets treatment conditions? Yes    DRUG SPECIFIC QUESTIONS:  Is the patient taking calcium and vitamin D? Yes - both  (Recommended)    Pt Instructed on following risks: (1) hypocalcemia, (2) osteonecrosis of the jaw, (3) atypical femoral fractures, (4) serious infections, and (5) dermatologic reactions?  Yes      REMINDER:  PREGNANCY CATEGORY X DRUG. OBTAIN NEGTATIVE PREGNANCY TEST PRIOR TO FIRST INFUSION FOR WOMEN OF CHILDBEARING ABILITY   REMS DRUG    Recommended Vitals/Observation:  Vitals: Obtain vitals prior to injection.  Observation: Patient may leave immediately following injection.        Weight Based Drug Calculations:    WEIGHT BASED DRUGS: NOT APPLICABLE / FLAT DOSE       Post Treatment: Patient tolerated treatment without issue and was discharged in no apparent distress.      Note Authored / Patient Cared for By: Dayana Fan LPN

## 2025-04-08 ENCOUNTER — OFFICE VISIT (OUTPATIENT)
Dept: PRIMARY CARE | Facility: CLINIC | Age: 85
End: 2025-04-08
Payer: MEDICARE

## 2025-04-08 VITALS
DIASTOLIC BLOOD PRESSURE: 80 MMHG | OXYGEN SATURATION: 96 % | HEART RATE: 74 BPM | WEIGHT: 145 LBS | HEIGHT: 64 IN | SYSTOLIC BLOOD PRESSURE: 144 MMHG | BODY MASS INDEX: 24.75 KG/M2

## 2025-04-08 DIAGNOSIS — Z78.0 ASYMPTOMATIC MENOPAUSAL STATE: ICD-10-CM

## 2025-04-08 DIAGNOSIS — N39.0 RECURRENT URINARY TRACT INFECTION: ICD-10-CM

## 2025-04-08 DIAGNOSIS — E04.9 ENLARGED THYROID: ICD-10-CM

## 2025-04-08 DIAGNOSIS — Z00.00 ROUTINE GENERAL MEDICAL EXAMINATION AT HEALTH CARE FACILITY: Primary | ICD-10-CM

## 2025-04-08 DIAGNOSIS — L90.0 LICHEN SCLEROSUS: ICD-10-CM

## 2025-04-08 DIAGNOSIS — J30.2 SEASONAL ALLERGIC RHINITIS, UNSPECIFIED TRIGGER: ICD-10-CM

## 2025-04-08 PROCEDURE — 1036F TOBACCO NON-USER: CPT

## 2025-04-08 PROCEDURE — 99215 OFFICE O/P EST HI 40 MIN: CPT | Mod: 25

## 2025-04-08 PROCEDURE — 99397 PER PM REEVAL EST PAT 65+ YR: CPT | Mod: CSA

## 2025-04-08 PROCEDURE — 1158F ADVNC CARE PLAN TLK DOCD: CPT

## 2025-04-08 PROCEDURE — 99214 OFFICE O/P EST MOD 30 MIN: CPT

## 2025-04-08 PROCEDURE — G0439 PPPS, SUBSEQ VISIT: HCPCS

## 2025-04-08 PROCEDURE — 1126F AMNT PAIN NOTED NONE PRSNT: CPT

## 2025-04-08 PROCEDURE — 90471 IMMUNIZATION ADMIN: CPT

## 2025-04-08 PROCEDURE — G0009 ADMIN PNEUMOCOCCAL VACCINE: HCPCS

## 2025-04-08 PROCEDURE — 1123F ACP DISCUSS/DSCN MKR DOCD: CPT

## 2025-04-08 PROCEDURE — 99214 OFFICE O/P EST MOD 30 MIN: CPT | Mod: 25,27

## 2025-04-08 PROCEDURE — 99397 PER PM REEVAL EST PAT 65+ YR: CPT

## 2025-04-08 PROCEDURE — 1159F MED LIST DOCD IN RCRD: CPT

## 2025-04-08 RX ORDER — CLOBETASOL PROPIONATE 0.5 MG/G
OINTMENT TOPICAL
Qty: 60 G | Refills: 1 | Status: SHIPPED | OUTPATIENT
Start: 2025-04-08

## 2025-04-08 RX ORDER — NYSTATIN 100000 U/G
CREAM TOPICAL 2 TIMES DAILY
Qty: 30 G | Refills: 1 | Status: SHIPPED | OUTPATIENT
Start: 2025-04-08 | End: 2025-04-15

## 2025-04-08 RX ORDER — CEFDINIR 300 MG/1
300 CAPSULE ORAL DAILY
Qty: 90 CAPSULE | Refills: 1 | Status: SHIPPED | OUTPATIENT
Start: 2025-04-08 | End: 2025-10-05

## 2025-04-08 ASSESSMENT — ENCOUNTER SYMPTOMS
MYALGIAS: 0
WHEEZING: 0
COUGH: 1
PALPITATIONS: 0
DIARRHEA: 0
NERVOUS/ANXIOUS: 0
DIAPHORESIS: 0
VOMITING: 0
ADENOPATHY: 0
HEMATURIA: 0
FEVER: 0
BLOOD IN STOOL: 0
ARTHRALGIAS: 0
NAUSEA: 0
SORE THROAT: 0
EYE ITCHING: 1
LIGHT-HEADEDNESS: 0
SHORTNESS OF BREATH: 0

## 2025-04-08 ASSESSMENT — PAIN SCALES - GENERAL: PAINLEVEL_OUTOF10: 0-NO PAIN

## 2025-04-08 ASSESSMENT — COGNITIVE AND FUNCTIONAL STATUS - GENERAL: VERBAL FLUENCY - ANIMAL NAMES (0 TO 25): 3

## 2025-04-08 NOTE — PROGRESS NOTES
[facility]: FAMILY MEDICINE  MEDICARE WELLNESS EXAM      Sherron Correa is a 85 y.o. female that is presenting today for Annual Exam, Cough, and Med Refill.    Subjective   Hx osteoporosis, dyslipidemia, recurrent UTI, lichen sclerosis, anxiety, TIA    Other providers: Dr. Kelley (Urologist, lichen sclerosis/reccurent UTI), Dr. Howard (neurologist), Dr. Kaba (ophthalmologist), servando in pelvic floor therapy    HLD/TIA: stable on 80 mg Atorvastatin and baby aspirin Tolerating medication with no side effects. Last lipid 8/2024.     Osteoporosis: patient on Prolia. Tolerating with no side effects. Last DEXA 10/2023, future order placed.      Anxiety: patient recently increased to 50 mg Sertraline by neurologist. She feels this is correct dose for her.     Lichen sclerosis and recurrent UTI: current urologist is on sailing trip for an entire year, does not return until 1/2026 per patient. Needs clobetasol and Nystatin for lichen sclerosis refilled. Needs refill on Cefdinir for her recurrent UTI. Also would like new referral.    Acute concerns: cough with PND. Patient feels allergies from Spring. And currently not using her flonase. +dry eyes, sneezing. Denies sinus pressure, SOB, wheezing, ear pain.           Denies changes or concerns with hearing.   Review of Systems   Constitutional:  Negative for diaphoresis and fever.   HENT:  Positive for postnasal drip and sneezing. Negative for congestion, hearing loss and sore throat.    Eyes:  Positive for itching. Negative for visual disturbance.   Respiratory:  Positive for cough. Negative for shortness of breath and wheezing.    Cardiovascular:  Negative for chest pain, palpitations and leg swelling.   Gastrointestinal:  Negative for blood in stool, diarrhea, nausea and vomiting.   Genitourinary:  Negative for hematuria.   Musculoskeletal:  Negative for arthralgias and myalgias.   Skin:  Positive for rash.   Allergic/Immunologic: Negative for  immunocompromised state.   Neurological:  Negative for syncope and light-headedness.   Hematological:  Negative for adenopathy.   Psychiatric/Behavioral:  Negative for suicidal ideas. The patient is not nervous/anxious.      ACTIVITIES OF DAILY LIVING:  Basic ADLs:  Problems with Bathing, Dressing, Toileting, Transferring, Continence, Feeding No  Instrumental ADLs:  Problems with Ability to use phone, Shopping, Cooking, House-keeping, Laundry, Transportation, Medication Management, Finance Management Yes    Advance Care Planning   Advanced Care Planning was discussed with patient:  The patient has an active surrogate decision-maker on file. The patient does not have an advanced care plan on file.    History    Past Medical History:   Diagnosis Date    Altered mental status, unspecified 03/17/2022    Mental status alteration    Body mass index (BMI) 23.0-23.9, adult 11/19/2021    BMI 23.0-23.9, adult    Body mass index (BMI) 24.0-24.9, adult 06/18/2021    BMI 24.0-24.9, adult    Body mass index (BMI) 24.0-24.9, adult 02/22/2022    BMI 24.0-24.9, adult    Chronic kidney disease, stage 1 04/09/2018    Stage 1 chronic kidney disease    Chronic rhinitis 02/09/2015    Rhinitis    Encounter for follow-up examination after completed treatment for conditions other than malignant neoplasm 03/17/2022    Hospital discharge follow-up    Hypo-osmolality and hyponatremia 03/18/2022    Hyponatremia    Other conditions influencing health status     No significant past medical history    Other conditions influencing health status 04/09/2018    History of cough    Other decreased white blood cell count 04/16/2019    Other decreased white blood cell (WBC) count    Other diseases of vocal cords 03/07/2015    Vocal cord dysfunction    Other female genital prolapse 01/13/2022    Pelvic floor weakness    Other malaise 08/14/2019    Malaise and fatigue    Other specified symptoms and signs involving the circulatory and respiratory systems  06/29/2015    Chronic throat clearing    Other voice and resonance disorders 04/09/2018    Other voice disturbance    Pelvic and perineal pain 11/15/2021    Pelvic pressure in female    Pelvic and perineal pain 06/23/2022    Female pelvic pain    Personal history of other diseases of the female genital tract 01/13/2022    History of vaginitis    Personal history of other diseases of the nervous system and sense organs 08/02/2019    History of blepharitis    Personal history of other diseases of the nervous system and sense organs 08/30/2022    History of benign essential tremor    Personal history of other diseases of the respiratory system 02/09/2015    History of sinusitis    Personal history of other infectious and parasitic diseases 01/01/2021    History of gram negative infection    Personal history of other specified conditions 10/28/2021    History of urinary frequency    Personal history of other specified conditions 03/17/2022    History of bradycardia    Personal history of other specified conditions 03/17/2022    History of palpitations    Personal history of other specified conditions 04/09/2018    History of voice disturbance    Personal history of urinary (tract) infections 04/09/2018    History of recurrent urinary tract infection    Shortness of breath 10/26/2021    Short of breath on exertion    Unspecified subjective visual disturbances 08/02/2019    Disturbance, visual, subjective    Unspecified symptoms and signs involving the genitourinary system 10/23/2020    Urinary symptom or sign    Unspecified symptoms and signs involving the genitourinary system 04/30/2021    Urinary symptom or sign    Urinary tract infection, site not specified 09/16/2021    Recurrent UTI    Urinary tract infection, site not specified 03/03/2022    Acute UTI     Past Surgical History:   Procedure Laterality Date    MR HEAD ANGIO WO IV CONTRAST  10/13/2020    MR HEAD ANGIO WO IV CONTRAST [facility] INPATIENT LEGACY    OTHER  SURGICAL HISTORY  03/02/2015    History Of Prior Surgery     Family History   Problem Relation Name Age of Onset    Breast cancer Mother  70     Allergies   Allergen Reactions    Nitrofurantoin Hives     Current Outpatient Medications on File Prior to Visit   Medication Sig Dispense Refill    aspirin 81 mg EC tablet Take 1 tablet (81 mg) by mouth once daily. 21 tablet 0    atorvastatin (Lipitor) 80 mg tablet Take 1 tablet (80 mg) by mouth once daily. 90 tablet 3    calcium carbonate-vitamin D3 600 mg-5 mcg (200 unit) tablet Take by mouth.      cholecalciferol, vitamin D3, 25 mcg (1,000 unit) tablet,chewable Chew twice a day.      cranberry conc-ascorbic acid 140-100 mg capsule Take by mouth.      denosumab (Prolia) 60 mg/mL syringe Inject under the skin.      fish oil concentrate (Omega-3) 120-180 mg capsule Take by mouth.      fluticasone (Flonase Sensimist) 27.5 mcg/actuation nasal spray Administer 2 sprays into each nostril once daily.      multivitamin tablet Take 1 tablet by mouth once daily.      omega-3 fatty acids-fish oil 300-1,000 mg capsule Take 1,200 mg by mouth in the morning and 1,200 mg in the evening.      sertraline (Zoloft) 50 mg tablet Take 1 tablet (50 mg) by mouth once daily. 30 tablet 11    [DISCONTINUED] cefdinir (Omnicef) 300 mg capsule Take 1 capsule (300 mg) by mouth once daily. 90 capsule 1    [DISCONTINUED] clobetasol (Temovate) 0.05 % ointment Applied twice daily for 1 week to the external vaginal tissues. 60 g 1    [DISCONTINUED] CALCIUM 26-VIT D3-MAGNESIUM 15 ORAL  (Patient not taking: Reported on 4/8/2025)      [DISCONTINUED] estradiol (Estrace) 0.01 % (0.1 mg/gram) vaginal cream Place a pea-sized or dime sized amount vaginally nightly for 2 weeks and then 3 times a week thereafter (Patient not taking: Reported on 4/8/2025) 42.5 g 2    [DISCONTINUED] nystatin-triamcinolone (Mycolog II) ointment APPLY SPARINGLY TO EXTERNAL VAGINA TWICE A DAY FOR 14 DAYS. (Patient not taking: Reported  on 4/8/2025) 30 g 11     No current facility-administered medications on file prior to visit.     Immunization History   Administered Date(s) Administered    Flu vaccine, quadrivalent, high-dose, preservative free, age 65y+ (FLUZONE) 10/12/2021, 10/04/2022    Flu vaccine, trivalent, preservative free, HIGH-DOSE, age 65y+ (Fluzone) 10/08/2014, 09/13/2017, 09/25/2018, 09/21/2019    Influenza, Seasonal, Quadrivalent, Adjuvanted 09/14/2023    Influenza, trivalent, adjuvanted 09/26/2024    Moderna COVID-19 vaccine, 12 years and older (50mcg/0.5mL)(Spikevax) 10/12/2023, 09/26/2024    Moderna COVID-19 vaccine, bivalent, blue cap/gray label *Check age/dose* 09/21/2022, 04/24/2023    Moderna SARS-CoV-2 Vaccination 01/27/2021, 02/24/2021, 10/31/2021, 04/27/2022    Pneumococcal conjugate vaccine, 13-valent (PREVNAR 13) 05/02/2016    Pneumococcal conjugate vaccine, 20-valent (PREVNAR 20) 04/08/2025    Pneumococcal polysaccharide vaccine, 23-valent, age 2 years and older (PNEUMOVAX 23) 11/04/2005, 02/21/2012    RSV, 60 Years And Older (AREXVY) 12/19/2023    Tdap vaccine, age 7 year and older (BOOSTRIX, ADACEL) 06/13/2014, 04/08/2025    Zoster vaccine, recombinant, adult (SHINGRIX) 08/02/2021, 10/12/2021    Zoster, live 05/01/2013     Patient's medical history was reviewed and updated either before or during this encounter.    Objective   Vitals:    04/08/25 0900   BP: 144/80   Pulse: 74   SpO2: 96%      Physical Exam  Constitutional:       General: She is not in acute distress.     Appearance: Normal appearance.   HENT:      Right Ear: Tympanic membrane and ear canal normal.      Left Ear: Tympanic membrane and ear canal normal.      Nose: Nose normal.      Right Turbinates: Enlarged and swollen.      Left Turbinates: Enlarged and swollen.      Mouth/Throat:      Mouth: Mucous membranes are moist.      Pharynx: Oropharyngeal exudate and posterior oropharyngeal erythema present.   Eyes:      Extraocular Movements: Extraocular  movements intact.      Conjunctiva/sclera: Conjunctivae normal.      Pupils: Pupils are equal, round, and reactive to light.   Neck:      Thyroid: Thyromegaly present. No thyroid tenderness.   Cardiovascular:      Rate and Rhythm: Normal rate and regular rhythm.      Pulses: Normal pulses.      Heart sounds: No murmur heard.     No gallop.   Pulmonary:      Effort: Pulmonary effort is normal.      Breath sounds: No wheezing, rhonchi or rales.   Abdominal:      General: Bowel sounds are normal.      Palpations: Abdomen is soft. There is no hepatomegaly, splenomegaly or mass.      Tenderness: There is no abdominal tenderness. There is no guarding.   Musculoskeletal:         General: Normal range of motion.      Right lower leg: No edema.      Left lower leg: No edema.   Lymphadenopathy:      Cervical: No cervical adenopathy.   Skin:     General: Skin is warm.   Neurological:      General: No focal deficit present.      Mental Status: She is alert.      Motor: Motor function is intact. No weakness.   Psychiatric:         Mood and Affect: Mood normal.         Thought Content: Thought content normal.         Assessment/Plan    Diagnoses and all orders for this visit:  Routine general medical examination at health care facility  -     1 Year Follow Up In Primary Care - Wellness Exam; Future  Recurrent urinary tract infection  -     cefdinir (Omnicef) 300 mg capsule; Take 1 capsule (300 mg) by mouth once daily.  -     Referral to Obstetrics / Gynecology; Future  Lichen sclerosus  -     clobetasol (Temovate) 0.05 % ointment; Applied twice daily for 1 week to the external vaginal tissues.  -     nystatin (Mycostatin) cream; Apply topically 2 times a day for 7 days. apply to affected area  -     Referral to Obstetrics / Gynecology; Future  Enlarged thyroid  -     US thyroid; Future  Asymptomatic menopausal state  -     XR DEXA bone density; Future  Seasonal allergic rhinitis, unspecified trigger  Other orders  -      Pneumococcal conjugate vaccine, 20-valent (PREVNAR 20)  -     Tdap vaccine, age 7 years and older  (BOOSTRIX)    Restart Flonase and as needed anti-histamine. Call allergist back for questions on allergy testing. US for thyroid placed, Future DEXA scanned placed. Given referral for uro-gyn. Refills sent in. Follow-up 6 months.    Patient Active Problem List   Diagnosis    Age-related osteoporosis without current pathological fracture    Recurrent urinary tract infection    Cystocele, midline    GERD (gastroesophageal reflux disease)    Amaurosis fugax of right eye    Pelvic floor weakness    Pelvic pain    Lichen sclerosus    Dyslipidemia       The patient was encouraged to ensure that any/all documentation is accurate and up to date, and that our office be provided a copy in the event that anything changes.    Kaylee Green PA-C

## 2025-04-15 ENCOUNTER — HOSPITAL ENCOUNTER (OUTPATIENT)
Dept: RADIOLOGY | Facility: CLINIC | Age: 85
Discharge: HOME | End: 2025-04-15
Payer: MEDICARE

## 2025-04-15 DIAGNOSIS — E04.9 ENLARGED THYROID: ICD-10-CM

## 2025-04-15 PROCEDURE — 76536 US EXAM OF HEAD AND NECK: CPT | Performed by: RADIOLOGY

## 2025-04-15 PROCEDURE — 76536 US EXAM OF HEAD AND NECK: CPT

## 2025-04-16 ENCOUNTER — TELEPHONE (OUTPATIENT)
Dept: PRIMARY CARE | Facility: CLINIC | Age: 85
End: 2025-04-16
Payer: MEDICARE

## 2025-05-19 ENCOUNTER — APPOINTMENT (OUTPATIENT)
Facility: CLINIC | Age: 85
End: 2025-05-19
Payer: MEDICARE

## 2025-05-19 VITALS
WEIGHT: 141 LBS | SYSTOLIC BLOOD PRESSURE: 143 MMHG | BODY MASS INDEX: 22.66 KG/M2 | HEIGHT: 66 IN | DIASTOLIC BLOOD PRESSURE: 79 MMHG

## 2025-05-19 DIAGNOSIS — N89.8 VAGINAL IRRITATION: Primary | ICD-10-CM

## 2025-05-19 PROCEDURE — 1160F RVW MEDS BY RX/DR IN RCRD: CPT | Performed by: OBSTETRICS & GYNECOLOGY

## 2025-05-19 PROCEDURE — 1159F MED LIST DOCD IN RCRD: CPT | Performed by: OBSTETRICS & GYNECOLOGY

## 2025-05-19 PROCEDURE — 1036F TOBACCO NON-USER: CPT | Performed by: OBSTETRICS & GYNECOLOGY

## 2025-05-19 PROCEDURE — 1126F AMNT PAIN NOTED NONE PRSNT: CPT | Performed by: OBSTETRICS & GYNECOLOGY

## 2025-05-19 PROCEDURE — 99213 OFFICE O/P EST LOW 20 MIN: CPT | Performed by: OBSTETRICS & GYNECOLOGY

## 2025-05-19 ASSESSMENT — ENCOUNTER SYMPTOMS
COLOR CHANGE: 0
VOMITING: 0
BACK PAIN: 0
SLEEP DISTURBANCE: 0
NAUSEA: 0
ABDOMINAL PAIN: 0
DIARRHEA: 0
ABDOMINAL DISTENTION: 0
BLOOD IN STOOL: 0
FATIGUE: 0
DYSURIA: 0
CHILLS: 0
APPETITE CHANGE: 0
FREQUENCY: 0
FEVER: 0
SHORTNESS OF BREATH: 0
FLANK PAIN: 0
CONSTIPATION: 0
HEMATURIA: 0
UNEXPECTED WEIGHT CHANGE: 0

## 2025-05-19 ASSESSMENT — PAIN SCALES - GENERAL: PAINLEVEL_OUTOF10: 0-NO PAIN

## 2025-05-19 NOTE — PROGRESS NOTES
Sherron Correa is a 85 y.o. No obstetric history on file. here for vulvar and vaginal irritation    Pt complains of vaginal irritation going on for several months. Treated previously by Dr. Kelley who is currently on leave from the office.  She also saw dermatology and had patch testing which indicated an allergy to certain products.  Pt states she stopped using all scented soaps/lotions and has had significant improvement in vulvar irritation. Pt states dermatology did not think she had evidence of LS and she has stopped using clobetasol. She was using estradiol intermittently on vulva/vaginal opening but she thought it was making vaginal irritation worse.   Denies bleeding or discharge.   Not currently sexually active.   Currently on cefdinir for UTI prophylaxis.        Past medical and surgical history reviewed.     Obstetric History  OB History    Para Term  AB Living        2   SAB IAB Ectopic Multiple Live Births       2        Past Medical History  She has a past medical history of Altered mental status, unspecified (2022), Body mass index (BMI) 23.0-23.9, adult (2021), Body mass index (BMI) 24.0-24.9, adult (2021), Body mass index (BMI) 24.0-24.9, adult (2022), Chronic kidney disease, stage 1 (2018), Chronic rhinitis (2015), Encounter for follow-up examination after completed treatment for conditions other than malignant neoplasm (2022), Hypo-osmolality and hyponatremia (2022), Other conditions influencing health status, Other conditions influencing health status (2018), Other decreased white blood cell count (2019), Other diseases of vocal cords (2015), Other female genital prolapse (2022), Other malaise (2019), Other specified symptoms and signs involving the circulatory and respiratory systems (2015), Other voice and resonance disorders (2018), Pelvic and perineal pain (11/15/2021), Pelvic and  "perineal pain (06/23/2022), Personal history of other diseases of the female genital tract (01/13/2022), Personal history of other diseases of the nervous system and sense organs (08/02/2019), Personal history of other diseases of the nervous system and sense organs (08/30/2022), Personal history of other diseases of the respiratory system (02/09/2015), Personal history of other infectious and parasitic diseases (01/01/2021), Personal history of other specified conditions (10/28/2021), Personal history of other specified conditions (03/17/2022), Personal history of other specified conditions (03/17/2022), Personal history of other specified conditions (04/09/2018), Personal history of urinary (tract) infections (04/09/2018), Shortness of breath (10/26/2021), Unspecified subjective visual disturbances (08/02/2019), Unspecified symptoms and signs involving the genitourinary system (10/23/2020), Unspecified symptoms and signs involving the genitourinary system (04/30/2021), Urinary tract infection, site not specified (09/16/2021), and Urinary tract infection, site not specified (03/03/2022).    Surgical History  She has a past surgical history that includes Other surgical history (03/02/2015) and MR angio head wo IV contrast (10/13/2020).     Social History  She reports that she has never smoked. She has never used smokeless tobacco. She reports that she does not currently use alcohol. She reports that she does not use drugs.    Family History  Family History[1]      /79 (BP Location: Left arm, Patient Position: Sitting)   Ht 1.664 m (5' 5.5\")   Wt 64 kg (141 lb)   BMI 23.11 kg/m²   No LMP recorded. Patient is postmenopausal.    Review of Systems   Constitutional:  Negative for appetite change, chills, fatigue, fever and unexpected weight change.   Respiratory:  Negative for shortness of breath.    Cardiovascular:  Negative for chest pain.   Gastrointestinal:  Negative for abdominal distention, abdominal pain, " blood in stool, constipation, diarrhea, nausea and vomiting.   Endocrine: Negative for cold intolerance and heat intolerance.   Genitourinary:  Negative for dyspareunia, dysuria, flank pain, frequency, genital sores, hematuria, menstrual problem, pelvic pain, urgency, vaginal bleeding, vaginal discharge and vaginal pain.   Musculoskeletal:  Negative for back pain.   Skin:  Negative for color change.   Psychiatric/Behavioral:  Negative for sleep disturbance.        Physical Exam  Constitutional:       Appearance: Normal appearance.   Genitourinary:     General: Normal vulva.      Vagina: Normal.      Cervix: Normal.      Uterus: Normal.       Adnexa: Right adnexa normal and left adnexa normal.          Comments: Small nodule at vaginal opening consistent with sebaceous gland. No friability or concerning features. Pt feels this is area of irritation.   Skin:     General: Skin is warm.   Neurological:      Mental Status: She is alert.   Psychiatric:         Mood and Affect: Mood normal.         Behavior: Behavior normal.           Assessment and Plan:    Vaginal cyst  Discussed monitoring vs removal. Pt does not wish to have removed.  Will cont follow up with dermatology  Can return any time for removal and should continue with annual exams        [1]   Family History  Problem Relation Name Age of Onset    Breast cancer Mother  70

## 2025-10-01 ENCOUNTER — APPOINTMENT (OUTPATIENT)
Dept: INFUSION THERAPY | Facility: CLINIC | Age: 85
End: 2025-10-01
Payer: MEDICARE